# Patient Record
Sex: FEMALE | Race: WHITE | NOT HISPANIC OR LATINO
[De-identification: names, ages, dates, MRNs, and addresses within clinical notes are randomized per-mention and may not be internally consistent; named-entity substitution may affect disease eponyms.]

---

## 2017-01-03 PROBLEM — Z00.00 ENCOUNTER FOR PREVENTIVE HEALTH EXAMINATION: Noted: 2017-01-03

## 2017-01-06 ENCOUNTER — APPOINTMENT (OUTPATIENT)
Dept: OBGYN | Facility: CLINIC | Age: 37
End: 2017-01-06

## 2017-01-17 ENCOUNTER — APPOINTMENT (OUTPATIENT)
Dept: ULTRASOUND IMAGING | Facility: CLINIC | Age: 37
End: 2017-01-17

## 2017-01-17 ENCOUNTER — APPOINTMENT (OUTPATIENT)
Dept: MAMMOGRAPHY | Facility: CLINIC | Age: 37
End: 2017-01-17

## 2017-01-18 ENCOUNTER — APPOINTMENT (OUTPATIENT)
Dept: CARDIOLOGY | Facility: CLINIC | Age: 37
End: 2017-01-18

## 2017-01-18 ENCOUNTER — NON-APPOINTMENT (OUTPATIENT)
Age: 37
End: 2017-01-18

## 2017-01-18 VITALS — OXYGEN SATURATION: 99 % | HEART RATE: 105 BPM | DIASTOLIC BLOOD PRESSURE: 91 MMHG | SYSTOLIC BLOOD PRESSURE: 146 MMHG

## 2017-01-18 DIAGNOSIS — Z82.41 FAMILY HISTORY OF SUDDEN CARDIAC DEATH: ICD-10-CM

## 2017-01-25 ENCOUNTER — APPOINTMENT (OUTPATIENT)
Dept: CV DIAGNOSTICS | Facility: HOSPITAL | Age: 37
End: 2017-01-25

## 2017-01-25 ENCOUNTER — OUTPATIENT (OUTPATIENT)
Dept: OUTPATIENT SERVICES | Facility: HOSPITAL | Age: 37
LOS: 1 days | End: 2017-01-25
Payer: COMMERCIAL

## 2017-01-25 ENCOUNTER — APPOINTMENT (OUTPATIENT)
Dept: CV DIAGNOSITCS | Facility: HOSPITAL | Age: 37
End: 2017-01-25

## 2017-01-25 DIAGNOSIS — R06.09 OTHER FORMS OF DYSPNEA: ICD-10-CM

## 2017-01-25 PROCEDURE — 93017 CV STRESS TEST TRACING ONLY: CPT

## 2017-01-25 PROCEDURE — 93016 CV STRESS TEST SUPVJ ONLY: CPT

## 2017-01-25 PROCEDURE — 93018 CV STRESS TEST I&R ONLY: CPT

## 2017-01-25 PROCEDURE — 93306 TTE W/DOPPLER COMPLETE: CPT

## 2017-01-25 PROCEDURE — 93306 TTE W/DOPPLER COMPLETE: CPT | Mod: 26

## 2017-02-06 ENCOUNTER — NON-APPOINTMENT (OUTPATIENT)
Age: 37
End: 2017-02-06

## 2017-02-06 ENCOUNTER — APPOINTMENT (OUTPATIENT)
Age: 37
End: 2017-02-06

## 2017-02-06 VITALS
BODY MASS INDEX: 25.76 KG/M2 | SYSTOLIC BLOOD PRESSURE: 112 MMHG | HEART RATE: 74 BPM | HEIGHT: 62 IN | WEIGHT: 140 LBS | DIASTOLIC BLOOD PRESSURE: 76 MMHG | OXYGEN SATURATION: 99 %

## 2017-02-13 ENCOUNTER — APPOINTMENT (OUTPATIENT)
Age: 37
End: 2017-02-13

## 2017-03-09 ENCOUNTER — APPOINTMENT (OUTPATIENT)
Dept: HUMAN REPRODUCTION | Facility: CLINIC | Age: 37
End: 2017-03-09

## 2017-05-01 ENCOUNTER — APPOINTMENT (OUTPATIENT)
Dept: OBGYN | Facility: CLINIC | Age: 37
End: 2017-05-01

## 2017-05-17 ENCOUNTER — APPOINTMENT (OUTPATIENT)
Dept: OBGYN | Facility: CLINIC | Age: 37
End: 2017-05-17

## 2017-06-20 ENCOUNTER — APPOINTMENT (OUTPATIENT)
Dept: OBGYN | Facility: CLINIC | Age: 37
End: 2017-06-20

## 2017-07-17 ENCOUNTER — APPOINTMENT (OUTPATIENT)
Dept: ANTEPARTUM | Facility: CLINIC | Age: 37
End: 2017-07-17

## 2017-07-17 ENCOUNTER — ASOB RESULT (OUTPATIENT)
Age: 37
End: 2017-07-17

## 2017-07-18 ENCOUNTER — APPOINTMENT (OUTPATIENT)
Dept: OBGYN | Facility: CLINIC | Age: 37
End: 2017-07-18

## 2017-08-15 ENCOUNTER — APPOINTMENT (OUTPATIENT)
Dept: OBGYN | Facility: CLINIC | Age: 37
End: 2017-08-15
Payer: COMMERCIAL

## 2017-08-15 PROCEDURE — 0502F SUBSEQUENT PRENATAL CARE: CPT

## 2017-08-29 ENCOUNTER — APPOINTMENT (OUTPATIENT)
Dept: OBGYN | Facility: CLINIC | Age: 37
End: 2017-08-29
Payer: COMMERCIAL

## 2017-08-29 PROCEDURE — 0502F SUBSEQUENT PRENATAL CARE: CPT

## 2017-09-01 ENCOUNTER — APPOINTMENT (OUTPATIENT)
Dept: OBGYN | Facility: CLINIC | Age: 37
End: 2017-09-01
Payer: COMMERCIAL

## 2017-09-01 PROCEDURE — 76816 OB US FOLLOW-UP PER FETUS: CPT

## 2017-09-01 PROCEDURE — 76817 TRANSVAGINAL US OBSTETRIC: CPT

## 2017-09-01 PROCEDURE — 0502F SUBSEQUENT PRENATAL CARE: CPT

## 2017-09-05 ENCOUNTER — APPOINTMENT (OUTPATIENT)
Dept: OBGYN | Facility: CLINIC | Age: 37
End: 2017-09-05

## 2017-09-15 ENCOUNTER — APPOINTMENT (OUTPATIENT)
Dept: ANTEPARTUM | Facility: CLINIC | Age: 37
End: 2017-09-15

## 2017-09-18 ENCOUNTER — APPOINTMENT (OUTPATIENT)
Dept: OBGYN | Facility: CLINIC | Age: 37
End: 2017-09-18
Payer: COMMERCIAL

## 2017-09-18 PROCEDURE — 90715 TDAP VACCINE 7 YRS/> IM: CPT

## 2017-09-18 PROCEDURE — 90471 IMMUNIZATION ADMIN: CPT

## 2017-09-18 PROCEDURE — 76815 OB US LIMITED FETUS(S): CPT

## 2017-09-18 PROCEDURE — 76817 TRANSVAGINAL US OBSTETRIC: CPT

## 2017-09-18 PROCEDURE — 36415 COLL VENOUS BLD VENIPUNCTURE: CPT

## 2017-09-18 PROCEDURE — 0502F SUBSEQUENT PRENATAL CARE: CPT

## 2017-10-03 ENCOUNTER — APPOINTMENT (OUTPATIENT)
Dept: OBGYN | Facility: CLINIC | Age: 37
End: 2017-10-03
Payer: COMMERCIAL

## 2017-10-03 PROCEDURE — 0502F SUBSEQUENT PRENATAL CARE: CPT

## 2017-10-16 ENCOUNTER — TRANSCRIPTION ENCOUNTER (OUTPATIENT)
Age: 37
End: 2017-10-16

## 2017-10-17 ENCOUNTER — APPOINTMENT (OUTPATIENT)
Dept: OBGYN | Facility: CLINIC | Age: 37
End: 2017-10-17
Payer: COMMERCIAL

## 2017-10-17 PROCEDURE — 90686 IIV4 VACC NO PRSV 0.5 ML IM: CPT

## 2017-10-17 PROCEDURE — 0502F SUBSEQUENT PRENATAL CARE: CPT

## 2017-10-17 PROCEDURE — 90471 IMMUNIZATION ADMIN: CPT

## 2017-10-17 PROCEDURE — 76816 OB US FOLLOW-UP PER FETUS: CPT

## 2017-10-23 ENCOUNTER — APPOINTMENT (OUTPATIENT)
Dept: OBGYN | Facility: CLINIC | Age: 37
End: 2017-10-23
Payer: COMMERCIAL

## 2017-10-23 PROCEDURE — 0502F SUBSEQUENT PRENATAL CARE: CPT

## 2017-10-31 ENCOUNTER — APPOINTMENT (OUTPATIENT)
Dept: OBGYN | Facility: CLINIC | Age: 37
End: 2017-10-31
Payer: COMMERCIAL

## 2017-10-31 PROCEDURE — 0502F SUBSEQUENT PRENATAL CARE: CPT

## 2017-11-06 ENCOUNTER — OUTPATIENT (OUTPATIENT)
Dept: OUTPATIENT SERVICES | Facility: HOSPITAL | Age: 37
LOS: 1 days | End: 2017-11-06
Payer: COMMERCIAL

## 2017-11-06 DIAGNOSIS — O44.12 COMPLETE PLACENTA PREVIA WITH HEMORRHAGE, SECOND TRIMESTER: ICD-10-CM

## 2017-11-06 DIAGNOSIS — Z01.818 ENCOUNTER FOR OTHER PREPROCEDURAL EXAMINATION: ICD-10-CM

## 2017-11-06 LAB
BLD GP AB SCN SERPL QL: NEGATIVE — SIGNIFICANT CHANGE UP
HCT VFR BLD CALC: 32.9 % — LOW (ref 34.5–45)
HGB BLD-MCNC: 10.6 G/DL — LOW (ref 11.5–15.5)
MCHC RBC-ENTMCNC: 27.8 PG — SIGNIFICANT CHANGE UP (ref 27–34)
MCHC RBC-ENTMCNC: 32.2 GM/DL — SIGNIFICANT CHANGE UP (ref 32–36)
MCV RBC AUTO: 86.4 FL — SIGNIFICANT CHANGE UP (ref 80–100)
PLATELET # BLD AUTO: 279 K/UL — SIGNIFICANT CHANGE UP (ref 150–400)
RBC # BLD: 3.81 M/UL — SIGNIFICANT CHANGE UP (ref 3.8–5.2)
RBC # FLD: 14.2 % — SIGNIFICANT CHANGE UP (ref 10.3–14.5)
RH IG SCN BLD-IMP: POSITIVE — SIGNIFICANT CHANGE UP
WBC # BLD: 12.17 K/UL — HIGH (ref 3.8–10.5)
WBC # FLD AUTO: 12.17 K/UL — HIGH (ref 3.8–10.5)

## 2017-11-06 PROCEDURE — 86901 BLOOD TYPING SEROLOGIC RH(D): CPT

## 2017-11-06 PROCEDURE — 86900 BLOOD TYPING SEROLOGIC ABO: CPT

## 2017-11-06 PROCEDURE — 86850 RBC ANTIBODY SCREEN: CPT

## 2017-11-06 PROCEDURE — G0463: CPT

## 2017-11-06 PROCEDURE — 85027 COMPLETE CBC AUTOMATED: CPT

## 2017-11-06 RX ORDER — CITRIC ACID/SODIUM CITRATE 300-500 MG
15 SOLUTION, ORAL ORAL ONCE
Qty: 0 | Refills: 0 | Status: COMPLETED | OUTPATIENT
Start: 2017-11-07 | End: 2017-11-07

## 2017-11-07 ENCOUNTER — RESULT REVIEW (OUTPATIENT)
Age: 37
End: 2017-11-07

## 2017-11-07 ENCOUNTER — INPATIENT (INPATIENT)
Facility: HOSPITAL | Age: 37
LOS: 3 days | Discharge: HOME | End: 2017-11-11
Attending: OBSTETRICS & GYNECOLOGY | Admitting: OBSTETRICS & GYNECOLOGY
Payer: COMMERCIAL

## 2017-11-07 VITALS
SYSTOLIC BLOOD PRESSURE: 111 MMHG | DIASTOLIC BLOOD PRESSURE: 74 MMHG | HEART RATE: 102 BPM | WEIGHT: 162.92 LBS | RESPIRATION RATE: 18 BRPM | OXYGEN SATURATION: 100 % | HEIGHT: 62 IN | TEMPERATURE: 98 F

## 2017-11-07 DIAGNOSIS — O44.12 COMPLETE PLACENTA PREVIA WITH HEMORRHAGE, SECOND TRIMESTER: ICD-10-CM

## 2017-11-07 LAB
RH IG SCN BLD-IMP: POSITIVE — SIGNIFICANT CHANGE UP
T PALLIDUM AB TITR SER: NEGATIVE — SIGNIFICANT CHANGE UP

## 2017-11-07 PROCEDURE — 58611 LIGATE OVIDUCT(S) ADD-ON: CPT

## 2017-11-07 PROCEDURE — 59510 CESAREAN DELIVERY: CPT

## 2017-11-07 RX ORDER — KETOROLAC TROMETHAMINE 30 MG/ML
30 SYRINGE (ML) INJECTION EVERY 6 HOURS
Qty: 0 | Refills: 0 | Status: DISCONTINUED | OUTPATIENT
Start: 2017-11-07 | End: 2017-11-09

## 2017-11-07 RX ORDER — IBUPROFEN 200 MG
600 TABLET ORAL EVERY 6 HOURS
Qty: 0 | Refills: 0 | Status: COMPLETED | OUTPATIENT
Start: 2017-11-07 | End: 2018-10-06

## 2017-11-07 RX ORDER — HYDROMORPHONE HYDROCHLORIDE 2 MG/ML
0.25 INJECTION INTRAMUSCULAR; INTRAVENOUS; SUBCUTANEOUS
Qty: 0 | Refills: 0 | Status: DISCONTINUED | OUTPATIENT
Start: 2017-11-07 | End: 2017-11-07

## 2017-11-07 RX ORDER — SODIUM CHLORIDE 9 MG/ML
1000 INJECTION, SOLUTION INTRAVENOUS ONCE
Qty: 0 | Refills: 0 | Status: COMPLETED | OUTPATIENT
Start: 2017-11-07 | End: 2017-11-07

## 2017-11-07 RX ORDER — OXYTOCIN 10 UNIT/ML
41.67 VIAL (ML) INJECTION
Qty: 20 | Refills: 0 | Status: DISCONTINUED | OUTPATIENT
Start: 2017-11-07 | End: 2017-11-11

## 2017-11-07 RX ORDER — LEVOTHYROXINE SODIUM 125 MCG
1 TABLET ORAL
Qty: 0 | Refills: 0 | COMMUNITY

## 2017-11-07 RX ORDER — FERROUS SULFATE 325(65) MG
1 TABLET ORAL
Qty: 0 | Refills: 0 | COMMUNITY

## 2017-11-07 RX ORDER — ACETAMINOPHEN 500 MG
975 TABLET ORAL EVERY 6 HOURS
Qty: 0 | Refills: 0 | Status: DISCONTINUED | OUTPATIENT
Start: 2017-11-07 | End: 2017-11-11

## 2017-11-07 RX ORDER — METOCLOPRAMIDE HCL 10 MG
10 TABLET ORAL ONCE
Qty: 0 | Refills: 0 | Status: DISCONTINUED | OUTPATIENT
Start: 2017-11-07 | End: 2017-11-07

## 2017-11-07 RX ORDER — TETANUS TOXOID, REDUCED DIPHTHERIA TOXOID AND ACELLULAR PERTUSSIS VACCINE, ADSORBED 5; 2.5; 8; 8; 2.5 [IU]/.5ML; [IU]/.5ML; UG/.5ML; UG/.5ML; UG/.5ML
0.5 SUSPENSION INTRAMUSCULAR ONCE
Qty: 0 | Refills: 0 | Status: DISCONTINUED | OUTPATIENT
Start: 2017-11-07 | End: 2017-11-11

## 2017-11-07 RX ORDER — ONDANSETRON 8 MG/1
4 TABLET, FILM COATED ORAL ONCE
Qty: 0 | Refills: 0 | Status: DISCONTINUED | OUTPATIENT
Start: 2017-11-07 | End: 2017-11-07

## 2017-11-07 RX ORDER — OXYTOCIN 10 UNIT/ML
333.33 VIAL (ML) INJECTION
Qty: 20 | Refills: 0 | Status: DISCONTINUED | OUTPATIENT
Start: 2017-11-07 | End: 2017-11-11

## 2017-11-07 RX ORDER — DIPHENHYDRAMINE HCL 50 MG
25 CAPSULE ORAL EVERY 6 HOURS
Qty: 0 | Refills: 0 | Status: DISCONTINUED | OUTPATIENT
Start: 2017-11-07 | End: 2017-11-11

## 2017-11-07 RX ORDER — OXYCODONE HYDROCHLORIDE 5 MG/1
5 TABLET ORAL EVERY 4 HOURS
Qty: 0 | Refills: 0 | Status: DISCONTINUED | OUTPATIENT
Start: 2017-11-07 | End: 2017-11-11

## 2017-11-07 RX ORDER — FERROUS SULFATE 325(65) MG
325 TABLET ORAL DAILY
Qty: 0 | Refills: 0 | Status: DISCONTINUED | OUTPATIENT
Start: 2017-11-07 | End: 2017-11-08

## 2017-11-07 RX ORDER — NALOXONE HYDROCHLORIDE 4 MG/.1ML
0.1 SPRAY NASAL
Qty: 0 | Refills: 0 | Status: DISCONTINUED | OUTPATIENT
Start: 2017-11-07 | End: 2017-11-09

## 2017-11-07 RX ORDER — ONDANSETRON 8 MG/1
4 TABLET, FILM COATED ORAL EVERY 6 HOURS
Qty: 0 | Refills: 0 | Status: DISCONTINUED | OUTPATIENT
Start: 2017-11-07 | End: 2017-11-09

## 2017-11-07 RX ORDER — SODIUM CHLORIDE 9 MG/ML
1000 INJECTION, SOLUTION INTRAVENOUS
Qty: 0 | Refills: 0 | Status: DISCONTINUED | OUTPATIENT
Start: 2017-11-07 | End: 2017-11-07

## 2017-11-07 RX ORDER — LANOLIN
1 OINTMENT (GRAM) TOPICAL
Qty: 0 | Refills: 0 | Status: DISCONTINUED | OUTPATIENT
Start: 2017-11-07 | End: 2017-11-11

## 2017-11-07 RX ORDER — DEXAMETHASONE 0.5 MG/5ML
4 ELIXIR ORAL EVERY 6 HOURS
Qty: 0 | Refills: 0 | Status: DISCONTINUED | OUTPATIENT
Start: 2017-11-07 | End: 2017-11-09

## 2017-11-07 RX ORDER — SODIUM CHLORIDE 9 MG/ML
1000 INJECTION, SOLUTION INTRAVENOUS
Qty: 0 | Refills: 0 | Status: DISCONTINUED | OUTPATIENT
Start: 2017-11-07 | End: 2017-11-11

## 2017-11-07 RX ORDER — FAMOTIDINE 10 MG/ML
20 INJECTION INTRAVENOUS ONCE
Qty: 0 | Refills: 0 | Status: COMPLETED | OUTPATIENT
Start: 2017-11-07 | End: 2017-11-07

## 2017-11-07 RX ORDER — OXYCODONE HYDROCHLORIDE 5 MG/1
5 TABLET ORAL
Qty: 0 | Refills: 0 | Status: COMPLETED | OUTPATIENT
Start: 2017-11-07 | End: 2017-11-14

## 2017-11-07 RX ORDER — HEPARIN SODIUM 5000 [USP'U]/ML
5000 INJECTION INTRAVENOUS; SUBCUTANEOUS EVERY 12 HOURS
Qty: 0 | Refills: 0 | Status: DISCONTINUED | OUTPATIENT
Start: 2017-11-07 | End: 2017-11-11

## 2017-11-07 RX ORDER — LEVOTHYROXINE SODIUM 125 MCG
50 TABLET ORAL DAILY
Qty: 0 | Refills: 0 | Status: DISCONTINUED | OUTPATIENT
Start: 2017-11-07 | End: 2017-11-07

## 2017-11-07 RX ORDER — CEFAZOLIN SODIUM 1 G
2000 VIAL (EA) INJECTION ONCE
Qty: 0 | Refills: 0 | Status: COMPLETED | OUTPATIENT
Start: 2017-11-07 | End: 2017-11-07

## 2017-11-07 RX ORDER — DOCUSATE SODIUM 100 MG
100 CAPSULE ORAL
Qty: 0 | Refills: 0 | Status: DISCONTINUED | OUTPATIENT
Start: 2017-11-07 | End: 2017-11-08

## 2017-11-07 RX ORDER — SIMETHICONE 80 MG/1
80 TABLET, CHEWABLE ORAL EVERY 4 HOURS
Qty: 0 | Refills: 0 | Status: DISCONTINUED | OUTPATIENT
Start: 2017-11-07 | End: 2017-11-11

## 2017-11-07 RX ORDER — GLYCERIN ADULT
1 SUPPOSITORY, RECTAL RECTAL AT BEDTIME
Qty: 0 | Refills: 0 | Status: DISCONTINUED | OUTPATIENT
Start: 2017-11-07 | End: 2017-11-11

## 2017-11-07 RX ADMIN — Medication 15 MILLILITER(S): at 13:29

## 2017-11-07 RX ADMIN — Medication 30 MILLIGRAM(S): at 20:48

## 2017-11-07 RX ADMIN — FAMOTIDINE 20 MILLIGRAM(S): 10 INJECTION INTRAVENOUS at 13:43

## 2017-11-07 RX ADMIN — Medication 125 MILLIUNIT(S)/MIN: at 20:17

## 2017-11-07 RX ADMIN — Medication 30 MILLIGRAM(S): at 21:37

## 2017-11-07 RX ADMIN — SODIUM CHLORIDE 125 MILLILITER(S): 9 INJECTION, SOLUTION INTRAVENOUS at 14:27

## 2017-11-07 RX ADMIN — SODIUM CHLORIDE 2000 MILLILITER(S): 9 INJECTION, SOLUTION INTRAVENOUS at 13:43

## 2017-11-08 ENCOUNTER — TRANSCRIPTION ENCOUNTER (OUTPATIENT)
Age: 37
End: 2017-11-08

## 2017-11-08 DIAGNOSIS — D50.0 IRON DEFICIENCY ANEMIA SECONDARY TO BLOOD LOSS (CHRONIC): ICD-10-CM

## 2017-11-08 DIAGNOSIS — E03.9 HYPOTHYROIDISM, UNSPECIFIED: ICD-10-CM

## 2017-11-08 LAB
HCT VFR BLD CALC: 27.8 % — LOW (ref 34.5–45)
HGB BLD-MCNC: 9.1 G/DL — LOW (ref 11.5–15.5)
MCHC RBC-ENTMCNC: 28.6 PG — SIGNIFICANT CHANGE UP (ref 27–34)
MCHC RBC-ENTMCNC: 32.7 GM/DL — SIGNIFICANT CHANGE UP (ref 32–36)
MCV RBC AUTO: 87.6 FL — SIGNIFICANT CHANGE UP (ref 80–100)
PLATELET # BLD AUTO: 240 K/UL — SIGNIFICANT CHANGE UP (ref 150–400)
RBC # BLD: 3.18 M/UL — LOW (ref 3.8–5.2)
RBC # FLD: 13.4 % — SIGNIFICANT CHANGE UP (ref 10.3–14.5)
WBC # BLD: 16.1 K/UL — HIGH (ref 3.8–10.5)
WBC # FLD AUTO: 16.1 K/UL — HIGH (ref 3.8–10.5)

## 2017-11-08 RX ORDER — DOCUSATE SODIUM 100 MG
100 CAPSULE ORAL THREE TIMES A DAY
Qty: 0 | Refills: 0 | Status: DISCONTINUED | OUTPATIENT
Start: 2017-11-08 | End: 2017-11-11

## 2017-11-08 RX ORDER — ASCORBIC ACID 60 MG
250 TABLET,CHEWABLE ORAL THREE TIMES A DAY
Qty: 0 | Refills: 0 | Status: DISCONTINUED | OUTPATIENT
Start: 2017-11-08 | End: 2017-11-11

## 2017-11-08 RX ORDER — FERROUS SULFATE 325(65) MG
325 TABLET ORAL
Qty: 0 | Refills: 0 | Status: DISCONTINUED | OUTPATIENT
Start: 2017-11-08 | End: 2017-11-11

## 2017-11-08 RX ORDER — LEVOTHYROXINE SODIUM 125 MCG
50 TABLET ORAL DAILY
Qty: 0 | Refills: 0 | Status: DISCONTINUED | OUTPATIENT
Start: 2017-11-08 | End: 2017-11-11

## 2017-11-08 RX ADMIN — Medication 250 MILLIGRAM(S): at 21:01

## 2017-11-08 RX ADMIN — Medication 25 MILLIGRAM(S): at 21:05

## 2017-11-08 RX ADMIN — Medication 30 MILLIGRAM(S): at 20:57

## 2017-11-08 RX ADMIN — Medication 975 MILLIGRAM(S): at 13:07

## 2017-11-08 RX ADMIN — Medication 100 MILLIGRAM(S): at 13:31

## 2017-11-08 RX ADMIN — Medication 100 MILLIGRAM(S): at 21:01

## 2017-11-08 RX ADMIN — Medication 250 MILLIGRAM(S): at 13:32

## 2017-11-08 RX ADMIN — HEPARIN SODIUM 5000 UNIT(S): 5000 INJECTION INTRAVENOUS; SUBCUTANEOUS at 05:17

## 2017-11-08 RX ADMIN — Medication 30 MILLIGRAM(S): at 08:45

## 2017-11-08 RX ADMIN — Medication 325 MILLIGRAM(S): at 08:44

## 2017-11-08 RX ADMIN — HEPARIN SODIUM 5000 UNIT(S): 5000 INJECTION INTRAVENOUS; SUBCUTANEOUS at 18:01

## 2017-11-08 RX ADMIN — Medication 30 MILLIGRAM(S): at 21:30

## 2017-11-08 RX ADMIN — Medication 325 MILLIGRAM(S): at 18:00

## 2017-11-08 RX ADMIN — Medication 30 MILLIGRAM(S): at 08:53

## 2017-11-08 RX ADMIN — Medication 30 MILLIGRAM(S): at 03:01

## 2017-11-08 RX ADMIN — Medication 50 MICROGRAM(S): at 06:22

## 2017-11-08 RX ADMIN — Medication 325 MILLIGRAM(S): at 13:31

## 2017-11-08 RX ADMIN — Medication 1 TABLET(S): at 13:07

## 2017-11-08 RX ADMIN — Medication 30 MILLIGRAM(S): at 16:14

## 2017-11-08 RX ADMIN — Medication 30 MILLIGRAM(S): at 15:38

## 2017-11-08 RX ADMIN — Medication 100 MILLIGRAM(S): at 08:45

## 2017-11-08 RX ADMIN — Medication 25 MILLIGRAM(S): at 15:43

## 2017-11-08 RX ADMIN — Medication 30 MILLIGRAM(S): at 03:30

## 2017-11-08 RX ADMIN — Medication 975 MILLIGRAM(S): at 13:45

## 2017-11-08 NOTE — LACTATION INITIAL EVALUATION - LACTATION INTERVENTIONS
mother declined pump observation, verbal instructions given along with written. to pump to initiate her supply until the infant is medically cleared to breastfeed./initiate skin to skin/initiate hand expression routine/initiate dual electric pump routine

## 2017-11-08 NOTE — LACTATION INITIAL EVALUATION - NS LACT CON REASON FOR REQ
general questions without assessment/37 weeks in NICU for RDS/premature/compromised infant/pump request/multiparous mom

## 2017-11-09 RX ORDER — IBUPROFEN 200 MG
600 TABLET ORAL EVERY 6 HOURS
Qty: 0 | Refills: 0 | Status: DISCONTINUED | OUTPATIENT
Start: 2017-11-09 | End: 2017-11-11

## 2017-11-09 RX ORDER — CALAMINE AND ZINC OXIDE AND PHENOL 160; 10 MG/ML; MG/ML
1 LOTION TOPICAL
Qty: 0 | Refills: 0 | Status: DISCONTINUED | OUTPATIENT
Start: 2017-11-09 | End: 2017-11-11

## 2017-11-09 RX ORDER — OXYCODONE HYDROCHLORIDE 5 MG/1
5 TABLET ORAL
Qty: 0 | Refills: 0 | Status: DISCONTINUED | OUTPATIENT
Start: 2017-11-09 | End: 2017-11-11

## 2017-11-09 RX ORDER — MAGNESIUM HYDROXIDE 400 MG/1
30 TABLET, CHEWABLE ORAL DAILY
Qty: 0 | Refills: 0 | Status: DISCONTINUED | OUTPATIENT
Start: 2017-11-09 | End: 2017-11-11

## 2017-11-09 RX ADMIN — OXYCODONE HYDROCHLORIDE 5 MILLIGRAM(S): 5 TABLET ORAL at 07:43

## 2017-11-09 RX ADMIN — OXYCODONE HYDROCHLORIDE 5 MILLIGRAM(S): 5 TABLET ORAL at 04:10

## 2017-11-09 RX ADMIN — Medication 1 TABLET(S): at 11:59

## 2017-11-09 RX ADMIN — Medication 325 MILLIGRAM(S): at 16:57

## 2017-11-09 RX ADMIN — Medication 600 MILLIGRAM(S): at 12:45

## 2017-11-09 RX ADMIN — OXYCODONE HYDROCHLORIDE 5 MILLIGRAM(S): 5 TABLET ORAL at 22:28

## 2017-11-09 RX ADMIN — Medication 250 MILLIGRAM(S): at 06:31

## 2017-11-09 RX ADMIN — OXYCODONE HYDROCHLORIDE 5 MILLIGRAM(S): 5 TABLET ORAL at 22:58

## 2017-11-09 RX ADMIN — Medication 30 MILLIGRAM(S): at 03:35

## 2017-11-09 RX ADMIN — Medication 975 MILLIGRAM(S): at 22:29

## 2017-11-09 RX ADMIN — Medication 975 MILLIGRAM(S): at 16:57

## 2017-11-09 RX ADMIN — Medication 30 MILLIGRAM(S): at 04:10

## 2017-11-09 RX ADMIN — Medication 975 MILLIGRAM(S): at 17:30

## 2017-11-09 RX ADMIN — Medication 100 MILLIGRAM(S): at 21:17

## 2017-11-09 RX ADMIN — Medication 250 MILLIGRAM(S): at 16:58

## 2017-11-09 RX ADMIN — Medication 325 MILLIGRAM(S): at 11:59

## 2017-11-09 RX ADMIN — OXYCODONE HYDROCHLORIDE 5 MILLIGRAM(S): 5 TABLET ORAL at 17:30

## 2017-11-09 RX ADMIN — OXYCODONE HYDROCHLORIDE 5 MILLIGRAM(S): 5 TABLET ORAL at 12:45

## 2017-11-09 RX ADMIN — Medication 975 MILLIGRAM(S): at 22:59

## 2017-11-09 RX ADMIN — Medication 50 MICROGRAM(S): at 06:31

## 2017-11-09 RX ADMIN — OXYCODONE HYDROCHLORIDE 5 MILLIGRAM(S): 5 TABLET ORAL at 03:41

## 2017-11-09 RX ADMIN — Medication 100 MILLIGRAM(S): at 06:31

## 2017-11-09 RX ADMIN — OXYCODONE HYDROCHLORIDE 5 MILLIGRAM(S): 5 TABLET ORAL at 16:55

## 2017-11-09 RX ADMIN — OXYCODONE HYDROCHLORIDE 5 MILLIGRAM(S): 5 TABLET ORAL at 08:30

## 2017-11-09 RX ADMIN — SIMETHICONE 80 MILLIGRAM(S): 80 TABLET, CHEWABLE ORAL at 07:42

## 2017-11-09 RX ADMIN — Medication 600 MILLIGRAM(S): at 21:47

## 2017-11-09 RX ADMIN — SIMETHICONE 80 MILLIGRAM(S): 80 TABLET, CHEWABLE ORAL at 18:32

## 2017-11-09 RX ADMIN — HEPARIN SODIUM 5000 UNIT(S): 5000 INJECTION INTRAVENOUS; SUBCUTANEOUS at 06:31

## 2017-11-09 RX ADMIN — OXYCODONE HYDROCHLORIDE 5 MILLIGRAM(S): 5 TABLET ORAL at 11:59

## 2017-11-09 RX ADMIN — Medication 250 MILLIGRAM(S): at 21:17

## 2017-11-09 RX ADMIN — Medication 600 MILLIGRAM(S): at 12:07

## 2017-11-09 RX ADMIN — Medication 600 MILLIGRAM(S): at 21:17

## 2017-11-09 RX ADMIN — Medication 100 MILLIGRAM(S): at 16:57

## 2017-11-09 RX ADMIN — MAGNESIUM HYDROXIDE 30 MILLILITER(S): 400 TABLET, CHEWABLE ORAL at 22:10

## 2017-11-10 ENCOUNTER — TRANSCRIPTION ENCOUNTER (OUTPATIENT)
Age: 37
End: 2017-11-10

## 2017-11-10 LAB
HCT VFR BLD CALC: 26.1 % — LOW (ref 34.5–45)
HGB BLD-MCNC: 8.8 G/DL — LOW (ref 11.5–15.5)
MCHC RBC-ENTMCNC: 29.8 PG — SIGNIFICANT CHANGE UP (ref 27–34)
MCHC RBC-ENTMCNC: 33.7 GM/DL — SIGNIFICANT CHANGE UP (ref 32–36)
MCV RBC AUTO: 88.5 FL — SIGNIFICANT CHANGE UP (ref 80–100)
PLATELET # BLD AUTO: 265 K/UL — SIGNIFICANT CHANGE UP (ref 150–400)
RBC # BLD: 2.95 M/UL — LOW (ref 3.8–5.2)
RBC # FLD: 13.6 % — SIGNIFICANT CHANGE UP (ref 10.3–14.5)
WBC # BLD: 13.2 K/UL — HIGH (ref 3.8–10.5)
WBC # FLD AUTO: 13.2 K/UL — HIGH (ref 3.8–10.5)

## 2017-11-10 RX ORDER — DOCUSATE SODIUM 100 MG
1 CAPSULE ORAL
Qty: 0 | Refills: 0 | COMMUNITY

## 2017-11-10 RX ORDER — SENNA PLUS 8.6 MG/1
1 TABLET ORAL DAILY
Qty: 0 | Refills: 0 | Status: DISCONTINUED | OUTPATIENT
Start: 2017-11-10 | End: 2017-11-11

## 2017-11-10 RX ORDER — ASCORBIC ACID 60 MG
1 TABLET,CHEWABLE ORAL
Qty: 0 | Refills: 0 | COMMUNITY
Start: 2017-11-10

## 2017-11-10 RX ORDER — ACETAMINOPHEN 500 MG
3 TABLET ORAL
Qty: 0 | Refills: 0 | COMMUNITY
Start: 2017-11-10

## 2017-11-10 RX ORDER — DOCUSATE SODIUM 100 MG
1 CAPSULE ORAL
Qty: 0 | Refills: 0 | COMMUNITY
Start: 2017-11-10

## 2017-11-10 RX ORDER — IBUPROFEN 200 MG
1 TABLET ORAL
Qty: 0 | Refills: 0 | COMMUNITY
Start: 2017-11-10

## 2017-11-10 RX ORDER — OXYCODONE HYDROCHLORIDE 5 MG/1
1 TABLET ORAL
Qty: 0 | Refills: 0 | COMMUNITY
Start: 2017-11-10

## 2017-11-10 RX ADMIN — Medication 600 MILLIGRAM(S): at 12:45

## 2017-11-10 RX ADMIN — Medication 600 MILLIGRAM(S): at 11:51

## 2017-11-10 RX ADMIN — OXYCODONE HYDROCHLORIDE 5 MILLIGRAM(S): 5 TABLET ORAL at 17:00

## 2017-11-10 RX ADMIN — Medication 325 MILLIGRAM(S): at 08:00

## 2017-11-10 RX ADMIN — Medication 250 MILLIGRAM(S): at 06:13

## 2017-11-10 RX ADMIN — Medication 100 MILLIGRAM(S): at 06:13

## 2017-11-10 RX ADMIN — Medication 600 MILLIGRAM(S): at 04:44

## 2017-11-10 RX ADMIN — OXYCODONE HYDROCHLORIDE 5 MILLIGRAM(S): 5 TABLET ORAL at 16:07

## 2017-11-10 RX ADMIN — HEPARIN SODIUM 5000 UNIT(S): 5000 INJECTION INTRAVENOUS; SUBCUTANEOUS at 17:37

## 2017-11-10 RX ADMIN — Medication 600 MILLIGRAM(S): at 23:46

## 2017-11-10 RX ADMIN — Medication 975 MILLIGRAM(S): at 22:46

## 2017-11-10 RX ADMIN — Medication 975 MILLIGRAM(S): at 04:44

## 2017-11-10 RX ADMIN — Medication 600 MILLIGRAM(S): at 22:46

## 2017-11-10 RX ADMIN — SENNA PLUS 1 TABLET(S): 8.6 TABLET ORAL at 17:29

## 2017-11-10 RX ADMIN — HEPARIN SODIUM 5000 UNIT(S): 5000 INJECTION INTRAVENOUS; SUBCUTANEOUS at 06:12

## 2017-11-10 RX ADMIN — Medication 325 MILLIGRAM(S): at 11:51

## 2017-11-10 RX ADMIN — Medication 975 MILLIGRAM(S): at 12:45

## 2017-11-10 RX ADMIN — Medication 50 MICROGRAM(S): at 06:12

## 2017-11-10 RX ADMIN — Medication 1 TABLET(S): at 11:51

## 2017-11-10 RX ADMIN — Medication 975 MILLIGRAM(S): at 11:51

## 2017-11-10 RX ADMIN — Medication 975 MILLIGRAM(S): at 23:46

## 2017-11-10 NOTE — DISCHARGE NOTE OB - CARE PLAN
Principal Discharge DX:	 delivery delivered  Goal:	return to baseline  Instructions for follow-up, activity and diet:	incision check in 2 weeks, pelvic rest x 6 weeks

## 2017-11-10 NOTE — CHART NOTE - NSCHARTNOTEFT_GEN_A_CORE
NP Event Note:    called to the bedside by the RN for a pt c/o increased bloating with mild nausea today. pt denies epigastric pain. reports + flatus. denies vomiting. able to eat a small lunch of pretzels    VS  BP: 107/71, P: 86. T: 36.7  gen:A&ox3  CV: rrr s1s2  abd: mild distended, non tender, normoactive BS x 4 quadrants  LE: no edema noted    36 y/o POD# 3 primary c/s for a placenta previa with abd. bloating  - ambulation to promote bowel movement  -mylicon for gas relief  -sennakot for gas relief  -bland food to prevent the formation of gas    Ariadne Dumont NP

## 2017-11-10 NOTE — DISCHARGE NOTE OB - MATERIALS PROVIDED
Discharge Medication Information for Patients and Families Pocket Guide/Vaccinations/[facility]  Screening Program/Shaken Baby Prevention Handout/Breastfeeding Mother’s Support Group Information/Guide to Postpartum Care/Birth Certificate Instructions/Back To Sleep Handout/[facility] Hearing Screen Program/Breastfeeding Guide and Packet/[place]  Immunization Record/Breastfeeding Log

## 2017-11-10 NOTE — DISCHARGE NOTE OB - MEDICATION SUMMARY - MEDICATIONS TO TAKE
I will START or STAY ON the medications listed below when I get home from the hospital:    ibuprofen 600 mg oral tablet  -- 1 tab(s) by mouth every 6 hours  -- Indication: For Pain    acetaminophen 325 mg oral tablet  -- 3 tab(s) by mouth every 6 hours  -- Indication: For Pain    oxyCODONE 5 mg oral tablet  -- 1 tab(s) by mouth every 4 hours, As needed, Severe Pain (7 - 10)  -- Indication: For Pain    Slow Fe 160 mg (50 mg elemental iron) oral tablet, extended release  -- 1 tab(s) by mouth once a day  -- Indication: For Anemia    Prenatal Multivitamins oral tablet  -- 1 tab(s) by mouth once a day  -- Indication: For Pnv    docusate sodium 100 mg oral capsule  -- 1 cap(s) by mouth 3 times a day  -- Indication: For Constipation    levothyroxine 50 mcg (0.05 mg) oral tablet  -- 1 tab(s) by mouth 6 times a week,m except sunday  -- Indication: For Hypothyroidism, unspecified type    ascorbic acid 250 mg oral tablet  -- 1 tab(s) by mouth 3 times a day  -- Indication: For Anemia I will START or STAY ON the medications listed below when I get home from the hospital:    ibuprofen 600 mg oral tablet  -- 1 tab(s) by mouth every 6 hours  -- Indication: For Pain    acetaminophen 325 mg oral tablet  -- 3 tab(s) by mouth every 6 hours  -- Indication: For Pain    oxyCODONE 5 mg oral tablet  -- 1 tab(s) by mouth every 4 hours, As needed, Severe Pain (7 - 10)  -- Indication: For Pain    oxyCODONE 5 mg oral tablet  -- 1 tab(s) by mouth every 4 hours MDD:5  -- Caution federal law prohibits the transfer of this drug to any person other  than the person for whom it was prescribed.  It is very important that you take or use this exactly as directed.  Do not skip doses or discontinue unless directed by your doctor.  May cause drowsiness.  Alcohol may intensify this effect.  Use care when operating dangerous machinery.  This prescription cannot be refilled.  Using more of this medication than prescribed may cause serious breathing problems.    -- Indication: For moderate pain    Slow Fe 160 mg (50 mg elemental iron) oral tablet, extended release  -- 1 tab(s) by mouth once a day  -- Indication: For Anemia    Prenatal Multivitamins oral tablet  -- 1 tab(s) by mouth once a day  -- Indication: For Pnv    docusate sodium 100 mg oral capsule  -- 1 cap(s) by mouth 3 times a day  -- Indication: For Constipation    levothyroxine 50 mcg (0.05 mg) oral tablet  -- 1 tab(s) by mouth 6 times a week,m except sunday  -- Indication: For Hypothyroidism, unspecified type    ascorbic acid 250 mg oral tablet  -- 1 tab(s) by mouth 3 times a day  -- Indication: For Anemia

## 2017-11-10 NOTE — DISCHARGE NOTE OB - PATIENT PORTAL LINK FT
“You can access the [facility] Patient Portal, offered by [facility], by registering with the following website: [email or web address]”

## 2017-11-11 VITALS
RESPIRATION RATE: 18 BRPM | TEMPERATURE: 99 F | HEART RATE: 81 BPM | SYSTOLIC BLOOD PRESSURE: 113 MMHG | DIASTOLIC BLOOD PRESSURE: 75 MMHG

## 2017-11-11 PROCEDURE — P9037: CPT

## 2017-11-11 PROCEDURE — 86900 BLOOD TYPING SEROLOGIC ABO: CPT

## 2017-11-11 PROCEDURE — 86901 BLOOD TYPING SEROLOGIC RH(D): CPT

## 2017-11-11 PROCEDURE — 86923 COMPATIBILITY TEST ELECTRIC: CPT

## 2017-11-11 PROCEDURE — 59025 FETAL NON-STRESS TEST: CPT

## 2017-11-11 PROCEDURE — 85027 COMPLETE CBC AUTOMATED: CPT

## 2017-11-11 PROCEDURE — P9016: CPT

## 2017-11-11 PROCEDURE — 86780 TREPONEMA PALLIDUM: CPT

## 2017-11-11 PROCEDURE — 59050 FETAL MONITOR W/REPORT: CPT

## 2017-11-11 PROCEDURE — P9059: CPT

## 2017-11-11 RX ORDER — OXYCODONE HYDROCHLORIDE 5 MG/1
1 TABLET ORAL
Qty: 20 | Refills: 0 | OUTPATIENT
Start: 2017-11-11

## 2017-11-11 RX ADMIN — Medication 1 TABLET(S): at 12:24

## 2017-11-11 RX ADMIN — Medication 600 MILLIGRAM(S): at 06:17

## 2017-11-11 RX ADMIN — Medication 600 MILLIGRAM(S): at 12:25

## 2017-11-11 RX ADMIN — HEPARIN SODIUM 5000 UNIT(S): 5000 INJECTION INTRAVENOUS; SUBCUTANEOUS at 06:17

## 2017-11-11 RX ADMIN — Medication 250 MILLIGRAM(S): at 06:18

## 2017-11-11 RX ADMIN — Medication 600 MILLIGRAM(S): at 19:23

## 2017-11-11 RX ADMIN — Medication 975 MILLIGRAM(S): at 06:18

## 2017-11-11 RX ADMIN — OXYCODONE HYDROCHLORIDE 5 MILLIGRAM(S): 5 TABLET ORAL at 18:53

## 2017-11-11 RX ADMIN — Medication 100 MILLIGRAM(S): at 12:26

## 2017-11-11 RX ADMIN — Medication 975 MILLIGRAM(S): at 12:24

## 2017-11-11 RX ADMIN — HEPARIN SODIUM 5000 UNIT(S): 5000 INJECTION INTRAVENOUS; SUBCUTANEOUS at 18:59

## 2017-11-11 RX ADMIN — Medication 50 MICROGRAM(S): at 07:42

## 2017-11-11 RX ADMIN — Medication 250 MILLIGRAM(S): at 18:53

## 2017-11-11 RX ADMIN — Medication 600 MILLIGRAM(S): at 18:53

## 2017-11-11 RX ADMIN — Medication 600 MILLIGRAM(S): at 13:00

## 2017-11-11 RX ADMIN — Medication 600 MILLIGRAM(S): at 07:15

## 2017-11-11 RX ADMIN — Medication 975 MILLIGRAM(S): at 13:00

## 2017-11-11 NOTE — PROGRESS NOTE ADULT - SUBJECTIVE AND OBJECTIVE BOX
Day 2 of Anesthesia Pain Management Service    SUBJECTIVE: I'm okay  Pain Scale Score:    [X] Refer to charted pain scores    THERAPY: Epidural Bupivacaine 0.01 % and Fentanyl 3 micrograms/mL     Demand Dose: 3 mL  Lockout: 15 minutes   Continuous Rate:  10 mL    MEDICATIONS  (STANDING):  acetaminophen   Tablet. 975 milliGRAM(s) Oral every 6 hours  ascorbic acid 250 milliGRAM(s) Oral three times a day  diphtheria/tetanus/pertussis (acellular) Vaccine (ADAcel) 0.5 milliLiter(s) IntraMuscular once  docusate sodium 100 milliGRAM(s) Oral three times a day  ferrous    sulfate 325 milliGRAM(s) Oral three times a day with meals  heparin  Injectable 5000 Unit(s) SubCutaneous every 12 hours  ibuprofen  Tablet 600 milliGRAM(s) Oral every 6 hours  ketorolac   Injectable 30 milliGRAM(s) IV Push every 6 hours  lactated ringers. 1000 milliLiter(s) (125 mL/Hr) IV Continuous <Continuous>  levothyroxine 50 MICROGram(s) Oral daily  oxyCODONE    IR 5 milliGRAM(s) Oral every 3 hours  oxytocin Infusion 41.667 milliUNIT(s)/Min (125 mL/Hr) IV Continuous <Continuous>  oxytocin Infusion 333.333 milliUNIT(s)/Min (1000 mL/Hr) IV Continuous <Continuous>  oxytocin Infusion 41.667 milliUNIT(s)/Min (125 mL/Hr) IV Continuous <Continuous>  prenatal multivitamin 1 Tablet(s) Oral daily    MEDICATIONS  (PRN):  diphenhydrAMINE   Capsule 25 milliGRAM(s) Oral every 6 hours PRN Itching  glycerin Suppository - Adult 1 Suppository(s) Rectal at bedtime PRN Constipation  lanolin Ointment 1 Application(s) Topical every 3 hours PRN Sore Nipples  oxyCODONE    IR 5 milliGRAM(s) Oral every 4 hours PRN Severe Pain (7 - 10)  simethicone 80 milliGRAM(s) Chew every 4 hours PRN Gas      OBJECTIVE:    Assessment of Epidural Catheter Site: 	    [ ] Dressing intact	[X] Site non-tender	[X] Site without erythema, discharge, edema  [ ] Epidural tubing and connection checked	[X] Gross neurological exam within normal limits  [X] Catheter removed                          9.1    16.1  )-----------( 240      ( 08 Nov 2017 05:35 )             27.8     Vital Signs Last 24 Hrs  T(C): 36.8 (11-09-17 @ 05:00), Max: 36.8 (11-08-17 @ 08:46)  T(F): 98.3 (11-09-17 @ 05:00), Max: 98.3 (11-09-17 @ 05:00)  HR: 71 (11-09-17 @ 05:00) (71 - 92)  BP: 104/71 (11-09-17 @ 05:00) (101/66 - 114/74)  BP(mean): --  RR: 18 (11-09-17 @ 05:00) (18 - 18)  SpO2: 97% (11-08-17 @ 21:17) (96% - 97%)      Sedation Score:	[X] Alert	[ ] Drowsy	[ ] Arousable  [ ] Asleep  [ ] Unresponsive    Side Effects:	[X] None	[ ] Nausea	[ ] Vomiting  [ ] Pruritus  		[ ] Weakness  [ ] Numbness  [ ] Other:    ASSESSMENT/ PLAN:    Therapy:                         [ ] Continue   [X] Discontinue   [X] Change to PRN Analgesics    Documentation and Verification of current medications:  [X] Done	[ ] Not done, not eligible, reason:    Comments:
Day 1 of Anesthesia Pain Management Service    SUBJECTIVE: I'm okay  Pain Scale Score:    [X] Refer to charted pain scores    THERAPY: Epidural Bupivacaine 0.01 % and Fentanyl 3 micrograms/mL     Demand Dose: 3 mL  Lockout: 15 minutes   Continuous Rate:  12 mL    MEDICATIONS  (STANDING):  acetaminophen   Tablet. 975 milliGRAM(s) Oral every 6 hours  ascorbic acid 250 milliGRAM(s) Oral three times a day  diphtheria/tetanus/pertussis (acellular) Vaccine (ADAcel) 0.5 milliLiter(s) IntraMuscular once  docusate sodium 100 milliGRAM(s) Oral three times a day  fentaNYL (3 MICROgram(s)/mL) + BUpivacaine 0.01% in 0.9% Sodium Chloride PCEA 250 milliLiter(s) Epidural PCA Continuous  ferrous    sulfate 325 milliGRAM(s) Oral three times a day with meals  heparin  Injectable 5000 Unit(s) SubCutaneous every 12 hours  ibuprofen  Tablet 600 milliGRAM(s) Oral every 6 hours  ketorolac   Injectable 30 milliGRAM(s) IV Push every 6 hours  lactated ringers. 1000 milliLiter(s) (125 mL/Hr) IV Continuous <Continuous>  levothyroxine 50 MICROGram(s) Oral daily  oxyCODONE    IR 5 milliGRAM(s) Oral every 3 hours  oxytocin Infusion 41.667 milliUNIT(s)/Min (125 mL/Hr) IV Continuous <Continuous>  oxytocin Infusion 333.333 milliUNIT(s)/Min (1000 mL/Hr) IV Continuous <Continuous>  oxytocin Infusion 41.667 milliUNIT(s)/Min (125 mL/Hr) IV Continuous <Continuous>  prenatal multivitamin 1 Tablet(s) Oral daily    MEDICATIONS  (PRN):  dexamethasone  Injectable 4 milliGRAM(s) IV Push every 6 hours PRN Nausea, IF ondansetron is ineffective after 30 - 60 minutes  diphenhydrAMINE   Capsule 25 milliGRAM(s) Oral every 6 hours PRN Itching  fentaNYL (3 MICROgram(s)/mL) + BUpivacaine 0.01% in 0.9% Sodium Chloride PCEA Rescue [facility] Bolus 5 milliLiter(s) Epidural every 15 minutes PRN Moderate Pain (4 - 6)  glycerin Suppository - Adult 1 Suppository(s) Rectal at bedtime PRN Constipation  lanolin Ointment 1 Application(s) Topical every 3 hours PRN Sore Nipples  naloxone Injectable 0.1 milliGRAM(s) IV Push every 3 minutes PRN For ANY of the following changes in patient status:  A. RR LESS THAN 10 breaths per minute, B. Oxygen saturation LESS THAN 90%, C. Sedation score of 6  ondansetron Injectable 4 milliGRAM(s) IV Push every 6 hours PRN Nausea  oxyCODONE    IR 5 milliGRAM(s) Oral every 4 hours PRN Severe Pain (7 - 10)  simethicone 80 milliGRAM(s) Chew every 4 hours PRN Gas      OBJECTIVE:    Assessment of Epidural Catheter Site: 	    [X] Dressing intact	[X] Site non-tender	[X] Site without erythema, discharge, edema  [X] Epidural tubing and connection checked	[X] Gross neurological exam within normal limits  [ ] Catheter removed – tip intact		                          9.1    16.1  )-----------( 240      ( 08 Nov 2017 05:35 )             27.8     Vital Signs Last 24 Hrs  T(C): 36.8 (11-08-17 @ 08:46), Max: 36.9 (11-08-17 @ 05:00)  T(F): 98.2 (11-08-17 @ 08:46), Max: 98.5 (11-08-17 @ 05:00)  HR: 92 (11-08-17 @ 08:46) (74 - 102)  BP: 114/74 (11-08-17 @ 08:46) (95/52 - 117/71)  BP(mean): 76 (11-07-17 @ 22:30) (61 - 88)  RR: 18 (11-08-17 @ 08:46) (14 - 18)  SpO2: 97% (11-07-17 @ 22:30) (96% - 100%)      Sedation Score:	[X] Alert	[ ] Drowsy	[ ] Arousable  [ ] Asleep  [ ] Unresponsive    Side Effects:	[X] None	[ ] Nausea	[ ] Vomiting  [ ] Pruritus  		[ ] Weakness  [ ] Numbness  [ ] Other:    ASSESSMENT/ PLAN:    Therapy:                         [X] Continue   [ ] Discontinue   [ ] Change to PRN Analgesics    Documentation and Verification of current medications:  [X] Done	[ ] Not done, not eligible, reason:    Comments:
PA POD # 2 C/S Note    Blood Type:  O Positive        Rubella:  Immune              RPR: Negative    Pain:  Controlled with Pain Protocol  Complaints:  None  Pt. is ambulating, DTV, passing flatus, & tolerating regular diet.  Lochia:  WNL  Breast/Formula Feeding-Infant in NICU due to "fluid in lung"    VS:  T(C): 36.8  HR: 71  BP: 104/71  RR: 16                        9.1    16.1  )-----------( 240      ( 08 Nov 2017 05:35 )             27.8     Complete Blood Count (11.06.17 @ 17:06)    WBC Count: 12.17 K/uL    RBC Count: 3.81 M/uL    Hemoglobin: 10.6 g/dL    Hematocrit: 32.9 %    Platelet Count - Automated: 279 K/uL      Abd:  Soft, non-distended, non-tender            Fundus-firm            Incision- C/D/I-SQ  Extremities:  Edema - none             Calf Tenderness - none    Assessment:    37y y.o. G 2 P 2002      POD # 2 S/P Primary C/S  for Placenta Previa with Breech Presentation with Anemia (VSS, asx) in stable condition.    PMHx significant for:  Fatty Liver, Hypothyroidism  Current Issues:  Anemia  Plan:  Increase OOB             Cont. pain protocol             CBC as above             Cont. Reg. Diet             Cont. PO Care.            Cont. DVT PPx             Cont. Synthroid              Iron Supplementation    GENIA Roberts
Pain Management Attending Addendum    SUBJECTIVE: Patient comfortable    Therapy:	  [ ] IV PCA	   [ ] Epidural           [ ] s/p Spinal Opoid              [x ] Postpartum infusion	  [ ] Patient controlled regional anesthesia (PCRA)    [ ] prn Analgesics    OBJECTIVE:   [x ] No new signs     [ ] Other:    Side Effects:  [x ] None			[ ] Other:    Assessment of Catheter Site:		[ ] Intact		[ ] Other:    ASSESSMENT/PLAN  [ ] Continue current therapy    [ ] Therapy changed to:    [ ] IV PCA       [ ] Epidural     [x ] prn Analgesics     [ ] post partum infusion    Comments:
Pain Management Attending Addendum    SUBJECTIVE: Patient doing well with PCEA    Therapy:    [X] PCEA    OBJECTIVE:   [X] Pain appropriately controlled    [ ] Other:    Side Effects:  [X] None	             [ ] Nausea              [ ] Pruritis        [ ] Weakness          [ ] Numbness        	[ ] Other:    ASSESSMENT/PLAN:    [X] Continue current therapy    [ ] Therapy changed to:    [ ] PRN Analgesics   [ ] IV PCA    Comments:
Postpartum Note-  Section POD#3    Allergies    No Known Allergies    Intolerances    Prenatal labs:  Rubella:   Immune        RPR:  Negative         Blood Type:  0+    S:Patient is a  37y G2    P 2   POD#3 S/P C/Sec  Subjective: Patient w/o complaints, pain is controlled.  Pt is OOB, tolerating PO, passing flatus. Lochia WNL.   Feeding: Pumping secondary to the baby in the NICU    O:  Vital Signs Last 24 Hrs  T(C): 36.8 (10 Nov 2017 05:29), Max: 36.9 (2017 10:36)  T(F): 98.2 (10 Nov 2017 05:29), Max: 98.4 (2017 10:36)  HR: 72 (10 Nov 2017 05:29) (72 - 84)  BP: 110/74 (10 Nov 2017 05:29) (105/76 - 113/73)  BP(mean): --  RR: 18 (10 Nov 2017 05:29) (18 - 18)  SpO2: 98% (2017 10:36) (98% - 98%)     Gen: NAD  CV: rrr s1s2, CTABL  Abdomen: Soft, nontender, non-distended, fundus firm.  Bowel Sounds x 4 quadrants  Incision: Clean, dry, and intact.  Negative erythema/edema/ecchymosis   Sub Q  Lochia WNL  Ext:  Neg Edema, Neg Calf tenderness. Pedal pulses palpated B/L      A/P:  37y  POD # 3 S/P  primary  section for breech presentation and placenta previa, doing well    PMHx: anemia, Fatty liver  Current Issues: hypothyroid-synthroid 50mcg, baby in the NICU for possible TTE    Increase OOB  Regular diet  AM CBC  PO Pain Protocol  Routine Postop/Postpartum care  Discharge Planning
Postpartum Note-  Section POD#4    Allergies    No Known Allergies    Intolerances    Prenatal labs:  Rubella:   Immune        RPR:  Negative         Blood Type:  0+    S:Patient is a  37y G2    P 2   POD#4 S/P C/Sec  Subjective: Patient w/o complaints, pain is controlled.  Pt is OOB, tolerating PO, passing flatus. Lochia WNL.   Feeding: Pumping secondary to the baby in the NICU    O:  Vital Signs stable afebrile     Gen: NAD  Abdomen: Soft, nontender, non-distended, fundus firm.  Bowel Sounds x 4 quadrants  Incision: Clean, dry, and intact.  Negative erythema/edema/ecchymosis   Sub Q  Lochia WNL  Ext:  Neg Edema, Neg Calf tenderness. Pedal pulses palpated B/L      A/P:  37y  POD # 4 S/P  primary  section for breech presentation and placenta previa, doing well    PMHx: anemia, Fatty liver  Current Issues: hypothyroid-synthroid 50mcg, baby in the NICU for possible TTE    Increase OOB  Regular diet  AM CBC  PO Pain Protocol  Routine Postop/Postpartum care  Discharge Planning
PA POD # 1 C/S Note    Blood Type:  O Positive        Rubella:  Immune              RPR: Negative    Pain:  Controlled with PCEA/Pain Protocol  Complaints:  None  Pt. is ambulating, DTV, passing flatus, & tolerating regular diet.  Lochia:  WNL  Breast/Formula Feeding-Infant in NICU due to "fluid in lung"    VS:  T(C): 36.9 (11-08-17 @ 05:00), Max: 36.9 (11-08-17 @ 05:00)  HR: 84 (11-08-17 @ 05:00) (74 - 102)  BP: 101/67 (11-08-17 @ 05:00) (95/52 - 117/71)  RR: 18 (11-08-17 @ 05:00) (14 - 18)  SpO2: 97% (11-07-17 @ 22:30) (96% - 100%)                          9.1    16.1  )-----------( 240      ( 08 Nov 2017 05:35 )             27.8     Complete Blood Count (11.06.17 @ 17:06)    WBC Count: 12.17 K/uL    RBC Count: 3.81 M/uL    Hemoglobin: 10.6 g/dL    Hematocrit: 32.9 %    Platelet Count - Automated: 279 K/uL      Abd:  Soft, non-distended, non-tender            Fundus-firm            Incision- C/D/I-SQ; Dressing removed  Extremities:  Edema - none             Calf Tenderness - none    Assessment:    37y y.o. G 2 P 2002      POD # 1 S/P Primary C/S  for Placenta Previa with Breech Presentation with Anemia (VSS, asx) in stable condition.    PMHx significant for:  Fatty Liver, Hypothyroidism  Current Issues:  Anemia  Plan:  Increase OOB             Cont. Tylenol, Toradol, PCEA, IVF             CBC as above             Cont. Reg. Diet             Cont. PO Care.            Cont. DVT PPx             Cont. Synthroid              Iron Supplementation    GENIA Roberts

## 2017-11-11 NOTE — PROGRESS NOTE ADULT - PROBLEM SELECTOR PLAN 1
Return in about 2 weeks (around 6/8/2017).    During the hours of 8:00 am to 5:00 pm Monday through Friday, please contact us at one of the following offices: The  [facility] access hospital [phone]. You can also contact us anytime thru [facility] to make an appointment, questions for your provider and medication refills.    If it is urgent that you speak with someone outside of these hours, our [facility] Call Center will be able to assist you at [phone].    Thank you for choosing [facility] for your Dermatology care.    You are scheduled for June 7 th at 10 am for follow up .    If your prescription(s) are not covered or too expensive please do not pick them up - call our office to discuss other options. Thank-you!    If any of your areas becomes bigger, bleed easily or for no reason, change color or are painful call for sooner appointment.     Triamcinolone 0.1% ointment: Apply to affected areas to the body 3 times a day. Until skin is smooth. In between moisturize skin with a cream or ointment. Ok to apply medication to the legs twice a day and wrap legs after medication is applied.     Ok to continue Prednisone 20 mg (taper) as prescribed.  
Cont. PP/PO Care
Increase OOB  Regular diet  AM CBC  PO Pain Protocol  Routine Postop/Postpartum care  Discharge Planning
Increase OOB  Regular diet  PO Pain Protocol  Routine Postop/Postpartum care  Discharge Planning
Cont. PP/PO Care

## 2017-11-11 NOTE — PROGRESS NOTE ADULT - ATTENDING COMMENTS
Pt seen and examined. Agree with above assessment and plan.
Pt seen and examined. Agree with above assessment and plan.
I have seen the patient and agree with above assessment and plan.
Patient seen and examined. Agree with above.

## 2017-11-11 NOTE — PROGRESS NOTE ADULT - ASSESSMENT
37y  POD # 3 S/P  primary  section for breech presentation and placenta previa, doing well
37y  POD # 4 S/P  primary  section for breech presentation and placenta previa, doing well
37y y.o. G 2 P 2002      POD # 2 S/P Primary C/S  for Placenta Previa with Breech Presentation with Anemia (VSS, asx) in stable condition.
37y y.o. G 2 P 2002      POD # 1 S/P Primary C/S  for Placenta Previa with Breech Presentation with Anemia (VSS, asx) in stable condition.

## 2017-11-22 ENCOUNTER — APPOINTMENT (OUTPATIENT)
Dept: OBGYN | Facility: CLINIC | Age: 37
End: 2017-11-22
Payer: COMMERCIAL

## 2017-11-22 PROCEDURE — 0503F POSTPARTUM CARE VISIT: CPT

## 2017-12-20 ENCOUNTER — APPOINTMENT (OUTPATIENT)
Dept: OBGYN | Facility: CLINIC | Age: 37
End: 2017-12-20
Payer: COMMERCIAL

## 2017-12-20 PROCEDURE — 0503F POSTPARTUM CARE VISIT: CPT

## 2018-04-15 ENCOUNTER — RESULT REVIEW (OUTPATIENT)
Age: 38
End: 2018-04-15

## 2018-04-16 ENCOUNTER — APPOINTMENT (OUTPATIENT)
Dept: OBGYN | Facility: CLINIC | Age: 38
End: 2018-04-16
Payer: COMMERCIAL

## 2018-04-16 PROCEDURE — 99395 PREV VISIT EST AGE 18-39: CPT

## 2018-04-27 NOTE — PROGRESS NOTE ADULT - MINUTES
Pt is a 71 yo F w/ PMHx of DM Type II and HTN p/w left sided weakness and numbness x 2 days.  HPI was obtained through interpretation by daughter.  Pt states that left sided weakness and numbness started Wednesday night. Pt states that she had associated drooling from the left side of her mouth and heavy tongue.  Pt states that her "left leg wouldn't respond" and her left arm felt weak, so she was not able to walk.  She states that she believed it was due to her diabetes and went to sleep. The next morning her daughter called her and she states that the pt's speech was "incomprehensible," so the daughter called an ambulance.  Pt states that the last time she felt normal was Wednesday morning and that she has never had a stroke or these symptoms before. Pt states that left sided weakness was better on Thursday than on Wednesday.  Pt endorses intermittent lightheadedness and blurry vision since wednesday.  Pt denies CP, SOB, N/V/D, fever, chills, diaphoresis, tinnitus, edema, vertigo.     Of note: Pt is prescribed donepezil and sertraline, but ran out and has not refilled her prescription/has not been taking these medications. [age] y/o Bengali speaking Female, with a PmHx of DM Type II, HTN, mild Dementia, Depression, p/w left sided weakness and numbness x 2 days.  HPI was obtained through interpretation by daughter.  Pt states that left sided weakness and numbness started Wednesday night. Pt states that she had associated drooling from the left side of her mouth and heavy tongue.  Pt states that her "left leg wouldn't respond" and her left arm felt weak, so she was not able to walk.  She states that she believed it was due to her diabetes and went to sleep. The next morning her daughter called her and she states that the pt's speech was "incomprehensible," so the daughter called an ambulance.  Pt states that the last time she felt normal was Wednesday morning and that she has never had a stroke or these symptoms before. Pt states that left sided weakness was better on Thursday than on Wednesday.  Pt endorses intermittent lightheadedness and blurry vision since Wednesday.  Pt denies CP, SOB, N/V/D, fever, chills, diaphoresis, tinnitus, edema, vertigo. She appears comfortable at this time. She is being admitted to telemetry for r/o cva.    => Of note: Pt was prescribed donepezil and sertraline, but ran out and has not refilled her prescription/has not been taking these medications for the past 2-3 months. 69 yo Turkish speaking woman PMH of insulin dependent DM Type II, HTN, mild Dementia, Depression, p/w slurred speechm left sided weakness and numbness x 2 days.  HPI was obtained through interpretation by daughter.  Pt states that left sided weakness and numbness started 2 days ago (Wednesday night_. Pt states that she had associated drooling from the left side of her mouth and her tongue felt heavy.  Pt states that her "left leg wouldn't respond" and her left arm felt weak, so she was not able to walk.  She states that she believed it was due to her diabetes and went to sleep. The next morning her daughter called her and she states that the pt's speech was "incomprehensible," so the daughter called an ambulance.  Pt states that the last time she felt normal was Wednesday morning and that she has never had a stroke or these symptoms before. Pt states that left sided weakness has improved and was better yesterday than initial day.  Pt endorses intermittent lightheadedness when ambulating and blurry vision since Wednesday.  Pt denies CP, SOB, N/V/D, fever, chills, diaphoresis, tinnitus, edema, vertigo. She appears comfortable at this time. She is being admitted to telemetry to r/o cva.    => Of note: Pt was prescribed donepezil and sertraline, but ran out and has not refilled her prescription/has not been taking these medications for the past 2-3 months. 71 yo Norwegian speaking woman PMH of insulin dependent DM Type II, HTN, mild Dementia, Depression, p/w slurred speechm left sided weakness and numbness x 2 days.  HPI was obtained through interpretation by daughter.  Pt states that left sided weakness and numbness started 2 days ago (Wednesday night). Pt states that she had associated drooling from the left side of her mouth and her tongue felt heavy.  Pt states that her "left leg wouldn't respond" and her left arm felt weak, so she was not able to walk.  She states that she believed it was due to her diabetes and went to sleep. The next morning her daughter called her and she states that the pt's speech was "incomprehensible," so the daughter called an ambulance.  Pt states that the last time she felt normal was Wednesday morning and that she has never had a stroke or these symptoms before. Pt states that left sided weakness has improved and was better yesterday than initial day.  Pt endorses intermittent lightheadedness when ambulating and blurry vision since Wednesday.  Pt denies CP, SOB, N/V/D, fever, chills, diaphoresis, tinnitus, edema, vertigo. She appears comfortable at this time. She is being admitted to telemetry to r/o cva.    => Of note: Pt was prescribed donepezil and sertraline, but ran out and has not refilled her prescription/has not been taking these medications for the past 2-3 months.

## 2019-03-26 NOTE — PRE-OP CHECKLIST - HEART RATE (BEATS/MIN)
SUBJECTIVE:  Here for a spot of concern    Site:  nose  Duration:  Couple months  Treatment:  None  Itching:  None  Bleeding:  Yes after picking  Change in size:  Smaller    Site:  Left temple  Duration:  Couple years  Treatment:  None  Itching:  None  Bleeding:  None  Change in size:  No      Patient denies any other skin problems.  Denies easy bruising or bleeding.    FHX of rosacea--negative  Sunscreens--negative  Occupation--retired  History of skin cancer--Actinic keratosis   Family history of skin cancer--type unknown-- aunt  Lives with--, [place]    OBJECTIVE:   (well developed/overweight) in NAD (no acute distress).  Patient has a pleasant affect.   Patient is well groomed.  Above the waist exam:  head (including face and scalp), neck, upper extremities,  chest, back, abdomen -- except breasts.    ASSESSMENT AND PLAN:    Seborrheic Keratosis  Hyperkeratotic, well-defined pink/brown papule on the left temple.  Follow  Call if change       Actinic Keratosis  Gritty, pink papule(s) on the mid nose, left ala, right mid nose, left lateral eyebrow   Total lesions treated: 5   Cryotherapy done with information given.  Patient opts to have procedure today. Understands risk of permanent pigment change, persistence, blister, crusting, inflammation.  Call if persists, recurs.    Suggest sunscreens, monthly self-examinations, and yearly total skin exam: declines  August--above the waist only.  Patient to watch for the ABCDE's of pigmented lesions or persistent crusts, erosions, irritated areas.  Technique of skin self-exam discussed with patient and given the AAD (American Academy of Dermatology) information on this.   Observe closely for skin damage/changes, and call if such occurs.   fhx skin ca    Call sooner if any problems or concerns.      On 3/26/2019, Balbina GARCIA CMA scribed the services personally performed by Antolin Toney MD  I, Dr. Toney, attest that I saw and examined the patient and agree with  the above documentation as scribed. The documentation recorded by the scribe accurately and completely reflects the service(s) I personally performed and the decisions made by me.           102

## 2019-04-08 ENCOUNTER — TRANSCRIPTION ENCOUNTER (OUTPATIENT)
Age: 39
End: 2019-04-08

## 2019-04-17 ENCOUNTER — TRANSCRIPTION ENCOUNTER (OUTPATIENT)
Age: 39
End: 2019-04-17

## 2021-04-09 ENCOUNTER — APPOINTMENT (OUTPATIENT)
Dept: OBGYN | Facility: CLINIC | Age: 41
End: 2021-04-09
Payer: COMMERCIAL

## 2021-04-09 VITALS
DIASTOLIC BLOOD PRESSURE: 80 MMHG | HEIGHT: 62 IN | BODY MASS INDEX: 26.73 KG/M2 | SYSTOLIC BLOOD PRESSURE: 116 MMHG | WEIGHT: 145.25 LBS | TEMPERATURE: 97.4 F

## 2021-04-09 DIAGNOSIS — Z78.9 OTHER SPECIFIED HEALTH STATUS: ICD-10-CM

## 2021-04-09 DIAGNOSIS — R06.00 DYSPNEA, UNSPECIFIED: ICD-10-CM

## 2021-04-09 PROCEDURE — 99395 PREV VISIT EST AGE 18-39: CPT

## 2021-04-09 PROCEDURE — 99072 ADDL SUPL MATRL&STAF TM PHE: CPT

## 2021-04-09 PROCEDURE — 36415 COLL VENOUS BLD VENIPUNCTURE: CPT

## 2021-04-09 RX ORDER — LEVOTHYROXINE SODIUM 0.05 MG/1
50 TABLET ORAL
Refills: 0 | Status: ACTIVE | COMMUNITY

## 2021-04-09 NOTE — PHYSICAL EXAM
[Appropriately responsive] : appropriately responsive [Alert] : alert [No Acute Distress] : no acute distress [Soft] : soft [Non-tender] : non-tender [Non-distended] : non-distended [Oriented x3] : oriented x3 [Examination Of The Breasts] : a normal appearance [No Discharge] : no discharge [No Masses] : no breast masses were palpable [Labia Majora] : normal [Labia Minora] : normal [No Bleeding] : There was no active vaginal bleeding [Normal] : normal [Uterine Adnexae] : normal

## 2021-04-10 ENCOUNTER — TRANSCRIPTION ENCOUNTER (OUTPATIENT)
Age: 41
End: 2021-04-10

## 2021-04-10 LAB
BASOPHILS # BLD AUTO: 0.04 K/UL
BASOPHILS NFR BLD AUTO: 0.4 %
EOSINOPHIL # BLD AUTO: 0.19 K/UL
EOSINOPHIL NFR BLD AUTO: 2 %
FSH SERPL-MCNC: 9.4 IU/L
HCT VFR BLD CALC: 42.4 %
HGB BLD-MCNC: 13.4 G/DL
IMM GRANULOCYTES NFR BLD AUTO: 0.2 %
LH SERPL-ACNC: 5.8 IU/L
LYMPHOCYTES # BLD AUTO: 2.3 K/UL
LYMPHOCYTES NFR BLD AUTO: 24.7 %
MAN DIFF?: NORMAL
MCHC RBC-ENTMCNC: 30.9 PG
MCHC RBC-ENTMCNC: 31.6 GM/DL
MCV RBC AUTO: 97.9 FL
MONOCYTES # BLD AUTO: 0.76 K/UL
MONOCYTES NFR BLD AUTO: 8.2 %
NEUTROPHILS # BLD AUTO: 6.01 K/UL
NEUTROPHILS NFR BLD AUTO: 64.5 %
PLATELET # BLD AUTO: 338 K/UL
PROLACTIN SERPL-MCNC: 17.7 NG/ML
RBC # BLD: 4.33 M/UL
RBC # FLD: 12 %
T3FREE SERPL-MCNC: 2.92 PG/ML
T4 FREE SERPL-MCNC: 1.2 NG/DL
TSH SERPL-ACNC: 1.33 UIU/ML
WBC # FLD AUTO: 9.32 K/UL

## 2021-04-11 LAB — HPV HIGH+LOW RISK DNA PNL CVX: NOT DETECTED

## 2021-04-11 NOTE — DISCUSSION/SUMMARY
[FreeTextEntry1] : Pap done today. Blood ordered to check hormone levels. \par \par Prescription for mammogram screening and breast US given. \par \par Self-breast exam reviewed. \par \par She will follow up annually and as needed.

## 2021-04-11 NOTE — HISTORY OF PRESENT ILLNESS
[Excessive Bleeding] : bleeding has been excessive [Using ___ Tampons Per 24 Hr] : she has been using [unfilled] tampons per 24 hours [Tubal Occlusion] : has had a tubal occlusion [Monogamous (Male Partner)] : is monogamous with a male partner [Y] : Positive pregnancy history [Menarche Age: ____] : age at menarche was [unfilled] [Currently Active] : currently active [Men] : men [Vaginal] : vaginal [No] : No [Patient refuses STI testing] : Patient refuses STI testing [LMPDate] : 4/8/2021 [PGHxTotal] : 2 [[facility] : [facility] [PGxPremature] : 1 [[facility]] : 1 [FreeTextEntry1] : 4/8/2021

## 2021-04-11 NOTE — REVIEW OF SYSTEMS
[Patient Intake Form Reviewed] : Patient intake form was reviewed [Negative] : Heme/Lymph [Recent Weight Gain (___ Lbs)] : recent [unfilled] ~Ulb weight gain

## 2021-04-11 NOTE — END OF VISIT
[FreeTextEntry3] : I, Kirstie Ryan solely acted as scribe for Dr. Karishma Chacon on 04/09/2021. All medical entries made by the Scribe were at my, Dr. Chacon's, direction and personally dictated by me on 04/09/2021. I have reviewed the chart and agree that the record accurately reflects my personal performance of the history, physical exam, assessment, and plan. I have also personally directed, reviewed, and agreed with the chart.

## 2021-06-14 LAB — CYTOLOGY CVX/VAG DOC THIN PREP: NORMAL

## 2021-08-11 NOTE — DISCHARGE NOTE OB - AFTER URINATION AND/OR BOWEL MOVEMENT, CLEAN WITH WARM WATER USING A PERI- BOTTLE, THEN PAT DRY WITH TOILET TISSUE
Discussed throat related symptoms and history of reflux  Reviewed LPR causes, symptoms, and treatment options  Informed pt about inflammation caused by acid reflux impacting the vocal cords  Offered laryngoscope in office today to rule out concerns causing the symptoms  Additional options include PPI, H 2 blockers, or GI consultation      Agreed to resume reflux medications  Follow up for laryngoscopy if needed Statement Selected

## 2022-04-05 ENCOUNTER — APPOINTMENT (OUTPATIENT)
Dept: OBGYN | Facility: CLINIC | Age: 42
End: 2022-04-05
Payer: COMMERCIAL

## 2022-04-05 VITALS
DIASTOLIC BLOOD PRESSURE: 68 MMHG | BODY MASS INDEX: 27.23 KG/M2 | HEIGHT: 62 IN | SYSTOLIC BLOOD PRESSURE: 112 MMHG | WEIGHT: 148 LBS

## 2022-04-05 PROCEDURE — 82270 OCCULT BLOOD FECES: CPT

## 2022-04-05 PROCEDURE — 99214 OFFICE O/P EST MOD 30 MIN: CPT | Mod: 25

## 2022-04-05 PROCEDURE — 36415 COLL VENOUS BLD VENIPUNCTURE: CPT

## 2022-04-05 PROCEDURE — 99396 PREV VISIT EST AGE 40-64: CPT

## 2022-04-07 LAB
BASOPHILS # BLD AUTO: 0.05 K/UL
BASOPHILS NFR BLD AUTO: 0.6 %
DATE COLLECTED: NORMAL
EOSINOPHIL # BLD AUTO: 0.09 K/UL
EOSINOPHIL NFR BLD AUTO: 1.2 %
FSH SERPL-MCNC: 6.5 IU/L
HCT VFR BLD CALC: 40.6 %
HEMOCCULT SP1 STL QL: NEGATIVE
HGB BLD-MCNC: 13 G/DL
HPV HIGH+LOW RISK DNA PNL CVX: NOT DETECTED
IMM GRANULOCYTES NFR BLD AUTO: 0.3 %
LH SERPL-ACNC: 5 IU/L
LYMPHOCYTES # BLD AUTO: 1.98 K/UL
LYMPHOCYTES NFR BLD AUTO: 25.4 %
MAN DIFF?: NORMAL
MCHC RBC-ENTMCNC: 31.3 PG
MCHC RBC-ENTMCNC: 32 GM/DL
MCV RBC AUTO: 97.6 FL
MONOCYTES # BLD AUTO: 0.64 K/UL
MONOCYTES NFR BLD AUTO: 8.2 %
NEUTROPHILS # BLD AUTO: 5 K/UL
NEUTROPHILS NFR BLD AUTO: 64.3 %
PLATELET # BLD AUTO: 339 K/UL
QUALITY CONTROL: YES
RBC # BLD: 4.16 M/UL
RBC # FLD: 12.5 %
TSH SERPL-ACNC: 1.01 UIU/ML
WBC # FLD AUTO: 7.78 K/UL

## 2022-04-09 NOTE — END OF VISIT
[FreeTextEntry3] : I, Deloris Jones solely acted as a scribe on behalf of  on 04/05/2022. All medical entries made by the scribe were at my, Dr. Chacon, direction and personally dictated by me on 04/05/2022 . I have reviewed the chart and agree that the record accurately reflects my personal performance of the history, physical exam, assessment and plan. I have also personally directed, reviewed and agreed with the chart.\par

## 2022-04-09 NOTE — REVIEW OF SYSTEMS
[Sight Problems] : sight problems [Sleep Disturbances] : sleep disturbances [Negative] : Heme/Lymph [Patient Intake Form Reviewed] : Patient intake form was reviewed

## 2022-04-09 NOTE — DISCUSSION/SUMMARY
[FreeTextEntry1] : Pap done today\par Informed patient Mirena IUD would be a good fit for her situation.\par Prescription for mammogram screening and breast US given.\par Self-breast exam reviewed. \par Recommended to receive a Hysteroscopy to evaluate the heavy periods. \par Hormonal blood panel collected today. \par She will follow up annually or as needed.\par \par All questions and concerns were discussed.

## 2022-04-09 NOTE — PHYSICAL EXAM
[Chaperone Present] : A chaperone was present in the examining room during all aspects of the physical examination [Appropriately responsive] : appropriately responsive [Alert] : alert [No Acute Distress] : no acute distress [Soft] : soft [Non-tender] : non-tender [Non-distended] : non-distended [Oriented x3] : oriented x3 [Examination Of The Breasts] : a normal appearance [No Discharge] : no discharge [No Masses] : no breast masses were palpable [Labia Majora] : normal [Labia Minora] : normal [Normal] : normal [Uterine Adnexae] : normal [No Tenderness] : no tenderness [Nl Sphincter Tone] : normal sphincter tone [FreeTextEntry1] : ANGEL Winn [FreeTextEntry9] : Guaiac negative

## 2022-04-09 NOTE — HISTORY OF PRESENT ILLNESS
[N] : Patient does not use contraception [Y] : Positive pregnancy history [Menarche Age: ____] : age at menarche was [unfilled] [Currently Active] : currently active [Men] : men [Vaginal] : vaginal [No] : No [TextBox_4] : Annual [Mammogramdate] : 2016 [PapSmeardate] : 4/9/21 [TextBox_31] : negative [ColonoscopyDate] : never [HPVDate] : 4/9/21 [TextBox_78] : negative [LMPDate] : 3/30/22 [PGHxTotal] : 2 [[facility]] : 1 [PGxPremature] : 1 [[facility]xLiving] : 2 [TextBox_29] : n/a [TextBox_36] : n/a [TextBox_6] : 3/30/22 [TextBox_9] : 13 [TextBox_28] : Very heavy periods , first 2 days of menses leaks through cloths  [TextBox_34] : n/a [TextBox_18] : n/a [FreeTextEntry1] : 3/30/22

## 2022-04-10 LAB
CYTOLOGY CVX/VAG DOC THIN PREP: NORMAL
PROLACTIN SERPL-MCNC: 26.1 NG/ML

## 2022-04-11 ENCOUNTER — RESULT REVIEW (OUTPATIENT)
Age: 42
End: 2022-04-11

## 2022-04-11 ENCOUNTER — APPOINTMENT (OUTPATIENT)
Dept: ULTRASOUND IMAGING | Facility: CLINIC | Age: 42
End: 2022-04-11
Payer: COMMERCIAL

## 2022-04-11 ENCOUNTER — APPOINTMENT (OUTPATIENT)
Dept: MAMMOGRAPHY | Facility: CLINIC | Age: 42
End: 2022-04-11
Payer: COMMERCIAL

## 2022-04-11 PROCEDURE — 77067 SCR MAMMO BI INCL CAD: CPT

## 2022-04-11 PROCEDURE — 76641 ULTRASOUND BREAST COMPLETE: CPT | Mod: 50

## 2022-04-11 PROCEDURE — 77063 BREAST TOMOSYNTHESIS BI: CPT

## 2022-04-18 ENCOUNTER — RESULT REVIEW (OUTPATIENT)
Age: 42
End: 2022-04-18

## 2022-04-18 ENCOUNTER — APPOINTMENT (OUTPATIENT)
Dept: ULTRASOUND IMAGING | Facility: CLINIC | Age: 42
End: 2022-04-18
Payer: COMMERCIAL

## 2022-04-18 ENCOUNTER — APPOINTMENT (OUTPATIENT)
Dept: MAMMOGRAPHY | Facility: CLINIC | Age: 42
End: 2022-04-18
Payer: COMMERCIAL

## 2022-04-18 PROCEDURE — G0279: CPT | Mod: RT

## 2022-04-18 PROCEDURE — 76641 ULTRASOUND BREAST COMPLETE: CPT | Mod: 50

## 2022-04-18 PROCEDURE — 77065 DX MAMMO INCL CAD UNI: CPT | Mod: RT

## 2022-04-29 ENCOUNTER — NON-APPOINTMENT (OUTPATIENT)
Age: 42
End: 2022-04-29

## 2022-05-10 ENCOUNTER — APPOINTMENT (OUTPATIENT)
Dept: ANTEPARTUM | Facility: CLINIC | Age: 42
End: 2022-05-10
Payer: COMMERCIAL

## 2022-05-10 ENCOUNTER — APPOINTMENT (OUTPATIENT)
Dept: OBGYN | Facility: CLINIC | Age: 42
End: 2022-05-10
Payer: COMMERCIAL

## 2022-05-10 ENCOUNTER — ASOB RESULT (OUTPATIENT)
Age: 42
End: 2022-05-10

## 2022-05-10 VITALS
DIASTOLIC BLOOD PRESSURE: 60 MMHG | BODY MASS INDEX: 27.23 KG/M2 | HEIGHT: 62 IN | SYSTOLIC BLOOD PRESSURE: 102 MMHG | WEIGHT: 148 LBS

## 2022-05-10 DIAGNOSIS — Z01.419 ENCOUNTER FOR GYNECOLOGICAL EXAMINATION (GENERAL) (ROUTINE) W/OUT ABNORMAL FINDINGS: ICD-10-CM

## 2022-05-10 DIAGNOSIS — Z12.11 ENCOUNTER FOR SCREENING FOR MALIGNANT NEOPLASM OF COLON: ICD-10-CM

## 2022-05-10 DIAGNOSIS — Z92.89 PERSONAL HISTORY OF OTHER MEDICAL TREATMENT: ICD-10-CM

## 2022-05-10 LAB
HCG UR QL: NEGATIVE
QUALITY CONTROL: YES

## 2022-05-10 PROCEDURE — 57500 BIOPSY OF CERVIX: CPT | Mod: 59

## 2022-05-10 PROCEDURE — 58558Z: CUSTOM

## 2022-05-10 PROCEDURE — 76830 TRANSVAGINAL US NON-OB: CPT

## 2022-05-10 PROCEDURE — 81025 URINE PREGNANCY TEST: CPT

## 2022-05-10 NOTE — PROCEDURE
[Hysteroscopy] : Hysteroscopy [Time out performed] : Pre-procedure time out performed.  Patient's name, date of birth and procedure confirmed. [Consent Obtained] : Consent obtained [Risks] : risks [Benefits] : benefits [Patient] : patient [Infection] : infection [Bleeding] : bleeding [Allergic Reaction] : allergic reaction [Tolerated Well] : Patient tolerated the procedure well

## 2022-05-15 PROBLEM — Z12.11 COLON CANCER SCREENING: Status: RESOLVED | Noted: 2021-04-09 | Resolved: 2022-05-15

## 2022-05-15 PROBLEM — Z92.89 HISTORY OF SCREENING MAMMOGRAPHY: Status: RESOLVED | Noted: 2021-04-09 | Resolved: 2022-05-15

## 2022-05-15 PROBLEM — Z01.419 ENCOUNTER FOR WELL WOMAN EXAM WITH ROUTINE GYNECOLOGICAL EXAM: Status: RESOLVED | Noted: 2021-04-09 | Resolved: 2022-05-15

## 2022-05-15 NOTE — DISCUSSION/SUMMARY
[FreeTextEntry1] : Cervical tissue biopsy was taken at 10 o'clock. \par IUD option was discussed with patient. She does not desire to have ab IUD insertion at this time. \par She will follow up in 2 weeks for results. . \par \par All questions and concerns were discussed.

## 2022-05-15 NOTE — HISTORY OF PRESENT ILLNESS
[N] : Patient does not use contraception [Y] : Positive pregnancy history [Menarche Age: ____] : age at menarche was [unfilled] [No] : Patient does not have concerns regarding sex [Currently Active] : currently active [Mammogramdate] : 04/18/22 [TextBox_19] : BR3 [TextBox_25] : BR3 [BreastSonogramDate] : 04/18/22 [PapSmeardate] : 04/05/22 [TextBox_31] : NEG [HPVDate] : 04/05/22 [TextBox_78] : NEG [LMPDate] : 04/26/22 [PGHxTotal] : 2 [[facility] HospitalxFul[facility]] : 1 [PGxPremature] : 1 [[facility]] : 2 [FreeTextEntry1] : 04/26/22

## 2022-05-15 NOTE — PHYSICAL EXAM
[Chaperone Present] : A chaperone was present in the examining room during all aspects of the physical examination [Appropriately responsive] : appropriately responsive [Alert] : alert [No Acute Distress] : no acute distress [Oriented x3] : oriented x3 [Labia Majora] : normal [Labia Minora] : normal [Normal] : normal [Uterine Adnexae] : normal [FreeTextEntry1] : ANGEL Prescott

## 2022-05-15 NOTE — END OF VISIT
[FreeTextEntry3] : I, Deloris Jones solely acted as a scribe on behalf of  on 05/10/2022. All medical entries made by the scribe were at my, Dr. Chacon, direction and personally dictated by me on 05/10/2022 . I have reviewed the chart and agree that the record accurately reflects my personal performance of the history, physical exam, assessment and plan. I have also personally directed, reviewed and agreed with the chart.\par \par

## 2022-05-17 ENCOUNTER — APPOINTMENT (OUTPATIENT)
Dept: SURGICAL ONCOLOGY | Facility: CLINIC | Age: 42
End: 2022-05-17
Payer: COMMERCIAL

## 2022-05-17 VITALS
DIASTOLIC BLOOD PRESSURE: 79 MMHG | WEIGHT: 148 LBS | RESPIRATION RATE: 15 BRPM | BODY MASS INDEX: 27.23 KG/M2 | TEMPERATURE: 97.7 F | SYSTOLIC BLOOD PRESSURE: 117 MMHG | HEART RATE: 78 BPM | HEIGHT: 62 IN | OXYGEN SATURATION: 98 %

## 2022-05-17 PROCEDURE — 99204 OFFICE O/P NEW MOD 45 MIN: CPT

## 2022-05-17 PROCEDURE — ZZZZZ: CPT

## 2022-05-17 NOTE — PHYSICAL EXAM
[Normal] : supple, no neck mass and thyroid not enlarged [Normal Supraclavicular Lymph Nodes] : normal supraclavicular lymph nodes [Normal Axillary Lymph Nodes] : normal axillary lymph nodes [Normal] : oriented to person, place and time, with appropriate affect [FreeTextEntry1] : JUAN C  present during exam  [de-identified] : Normal S1, S2.  Regular rate and rhythm. [de-identified] : Complete breast exam performed in supine and upright positions.  No palpable masses, tenderness, nipple discharge, inversion, deviation or enlarged axillary or supraclavicular lymph nodes bilaterally. [de-identified] : Clear breath sounds bilaterally, normal respiratory effort.

## 2022-05-17 NOTE — CONSULT LETTER
[Dear  ___] : Dear  [unfilled], [Consult Letter:] : I had the pleasure of evaluating your patient, [unfilled]. [Consult Closing:] : Thank you very much for allowing me to participate in the care of this patient.  If you have any questions, please do not hesitate to contact me. [Sincerely,] : Sincerely, [FreeTextEntry2] : Karishma Chacon [FreeTextEntry3] : Dr. Anshul Osorio

## 2022-05-17 NOTE — ASSESSMENT
[FreeTextEntry1] : IMP:\par Probably benign mass left breast for which f/u in 6 months advised \par Dense\par No suspicious findings on exam\par  \par \par PLAN:\par Left breast Ultrasound 10/2022 (ordered)\par Pt. will call for results

## 2022-05-17 NOTE — HISTORY OF PRESENT ILLNESS
[de-identified] : Ms. Sol is a 42 y/o female presenting for an initial consultation referred by Karishma Chacon MD. \par \par b/l MMG/US (2022): Indeterminate area of possible architectural distortion in right breast, retroareolar location. Indeterminate cyst versus mass in left breast (2:00). BIRADS-0 \par \par b/l MMG/US (2022): no mammographic evidence of malignancy. Probably benign left breast mass. Targeted left breast ultrasound in 6 months is recommended to demonstrate stability.  BIRADS- 3 \par \par Family history of breast cancer involving her maternal aunt @ age 40 and cousin @ age 40. . \par Past medical history notable for Hypothyroidism, BCC, , arthroscopy of right knee, \par Never a smoker, social alcohol use. \par  (age at first pregnancy - 36 y/o); Menarche age: 15 y/o; LMP: 22\par \par Today she denies palpable breast masses, nipple discharge, skin changes, inversion or breast pain. Denies constitutional symptoms. No health changes.

## 2022-05-20 ENCOUNTER — TRANSCRIPTION ENCOUNTER (OUTPATIENT)
Age: 42
End: 2022-05-20

## 2022-05-20 LAB — CORE LAB BIOPSY: NORMAL

## 2022-09-16 ENCOUNTER — TRANSCRIPTION ENCOUNTER (OUTPATIENT)
Age: 42
End: 2022-09-16

## 2022-10-17 ENCOUNTER — APPOINTMENT (OUTPATIENT)
Dept: ULTRASOUND IMAGING | Facility: CLINIC | Age: 42
End: 2022-10-17

## 2022-10-17 ENCOUNTER — RESULT REVIEW (OUTPATIENT)
Age: 42
End: 2022-10-17

## 2022-10-17 PROCEDURE — 76642 ULTRASOUND BREAST LIMITED: CPT | Mod: LT

## 2023-04-18 ENCOUNTER — APPOINTMENT (OUTPATIENT)
Dept: OBGYN | Facility: CLINIC | Age: 43
End: 2023-04-18
Payer: COMMERCIAL

## 2023-04-18 ENCOUNTER — NON-APPOINTMENT (OUTPATIENT)
Age: 43
End: 2023-04-18

## 2023-04-18 ENCOUNTER — RESULT REVIEW (OUTPATIENT)
Age: 43
End: 2023-04-18

## 2023-04-18 VITALS
SYSTOLIC BLOOD PRESSURE: 126 MMHG | WEIGHT: 144 LBS | BODY MASS INDEX: 26.5 KG/M2 | DIASTOLIC BLOOD PRESSURE: 78 MMHG | HEIGHT: 62 IN

## 2023-04-18 DIAGNOSIS — N88.9 NONINFLAMMATORY DISORDER OF CERVIX UTERI, UNSPECIFIED: ICD-10-CM

## 2023-04-18 DIAGNOSIS — N63.0 UNSPECIFIED LUMP IN UNSPECIFIED BREAST: ICD-10-CM

## 2023-04-18 DIAGNOSIS — G47.00 INSOMNIA, UNSPECIFIED: ICD-10-CM

## 2023-04-18 DIAGNOSIS — E06.3 AUTOIMMUNE THYROIDITIS: ICD-10-CM

## 2023-04-18 DIAGNOSIS — R92.2 INCONCLUSIVE MAMMOGRAM: ICD-10-CM

## 2023-04-18 DIAGNOSIS — Z12.31 ENCOUNTER FOR SCREENING MAMMOGRAM FOR MALIGNANT NEOPLASM OF BREAST: ICD-10-CM

## 2023-04-18 DIAGNOSIS — R45.86 EMOTIONAL LABILITY: ICD-10-CM

## 2023-04-18 PROCEDURE — 99396 PREV VISIT EST AGE 40-64: CPT

## 2023-04-18 PROCEDURE — 99213 OFFICE O/P EST LOW 20 MIN: CPT | Mod: 25

## 2023-04-18 PROCEDURE — 36415 COLL VENOUS BLD VENIPUNCTURE: CPT

## 2023-04-21 ENCOUNTER — TRANSCRIPTION ENCOUNTER (OUTPATIENT)
Age: 43
End: 2023-04-21

## 2023-04-21 LAB
BASOPHILS # BLD AUTO: 0.03 K/UL
BASOPHILS NFR BLD AUTO: 0.3 %
EOSINOPHIL # BLD AUTO: 0.08 K/UL
EOSINOPHIL NFR BLD AUTO: 0.8 %
FSH SERPL-MCNC: 3.8 IU/L
HCT VFR BLD CALC: 42.1 %
HGB BLD-MCNC: 13.9 G/DL
HPV HIGH+LOW RISK DNA PNL CVX: NOT DETECTED
IMM GRANULOCYTES NFR BLD AUTO: 0.3 %
LH SERPL-ACNC: 4 IU/L
LYMPHOCYTES # BLD AUTO: 1.95 K/UL
LYMPHOCYTES NFR BLD AUTO: 20.1 %
MAN DIFF?: NORMAL
MCHC RBC-ENTMCNC: 31.2 PG
MCHC RBC-ENTMCNC: 33 GM/DL
MCV RBC AUTO: 94.6 FL
MONOCYTES # BLD AUTO: 0.95 K/UL
MONOCYTES NFR BLD AUTO: 9.8 %
NEUTROPHILS # BLD AUTO: 6.68 K/UL
NEUTROPHILS NFR BLD AUTO: 68.7 %
PLATELET # BLD AUTO: 339 K/UL
PROLACTIN SERPL-MCNC: 25.5 NG/ML
RBC # BLD: 4.45 M/UL
RBC # FLD: 12.6 %
T3 SERPL-MCNC: 102 NG/DL
T4 SERPL-MCNC: 8.3 UG/DL
TSH SERPL-ACNC: 1.2 UIU/ML
WBC # FLD AUTO: 9.72 K/UL

## 2023-04-23 LAB — CYTOLOGY CVX/VAG DOC THIN PREP: NORMAL

## 2023-04-23 NOTE — END OF VISIT
[FreeTextEntry3] : I, Loulou Yan, solely acted as scribe for Dr. Karishma Chacon on 04/18/23. All medical entries made by the Scribe were at my, Dr. Chacon's, direction and personally dictated by me on 04/18/23. I have reviewed the chart and agree that the record accurately reflects my personal performance of the history, physical exam, assessment and plan. I have also personally directed, reviewed, and agreed with the chart.

## 2023-04-23 NOTE — HISTORY OF PRESENT ILLNESS
[N] : Patient does not use contraception [Y] : Positive pregnancy history [Menarche Age: ____] : age at menarche was [unfilled] [Currently Active] : currently active [Mammogramdate] : 04/18/22 [TextBox_19] : BR3 [TextBox_25] : BR3 [BreastSonogramDate] : 04/18/22 [PapSmeardate] : 04/05/22 [TextBox_31] : NEG [HPVDate] : 04/05/22 [LMPDate] : 03/24/23 [TextBox_78] : NEG [PGHxTotal] : 2 [PGxPremature] : 1 [[facility]xFul[facility]] : 1 [[facility]] : 2 [FreeTextEntry1] : 03/24/23

## 2023-04-23 NOTE — DISCUSSION/SUMMARY
[FreeTextEntry1] : Pap done today.\par \par Hormone and thyroid panel performed today.\par \par Prescription for mammogram screening, breast US, and transvaginal sonogram US given.\par \par Self-breast exam reviewed.\par \par She will follow up annually and as needed.

## 2023-04-25 ENCOUNTER — RESULT REVIEW (OUTPATIENT)
Age: 43
End: 2023-04-25

## 2023-04-25 ENCOUNTER — APPOINTMENT (OUTPATIENT)
Dept: ULTRASOUND IMAGING | Facility: CLINIC | Age: 43
End: 2023-04-25
Payer: COMMERCIAL

## 2023-04-25 ENCOUNTER — APPOINTMENT (OUTPATIENT)
Dept: MAMMOGRAPHY | Facility: CLINIC | Age: 43
End: 2023-04-25
Payer: COMMERCIAL

## 2023-04-25 PROCEDURE — 77066 DX MAMMO INCL CAD BI: CPT

## 2023-04-25 PROCEDURE — 76641 ULTRASOUND BREAST COMPLETE: CPT | Mod: 50

## 2023-04-25 PROCEDURE — 76830 TRANSVAGINAL US NON-OB: CPT

## 2023-04-25 PROCEDURE — G0279: CPT

## 2023-04-27 ENCOUNTER — RESULT REVIEW (OUTPATIENT)
Age: 43
End: 2023-04-27

## 2023-10-02 ENCOUNTER — RESULT REVIEW (OUTPATIENT)
Age: 43
End: 2023-10-02

## 2023-10-02 ENCOUNTER — APPOINTMENT (OUTPATIENT)
Dept: ULTRASOUND IMAGING | Facility: CLINIC | Age: 43
End: 2023-10-02
Payer: COMMERCIAL

## 2023-10-02 PROCEDURE — 76642 ULTRASOUND BREAST LIMITED: CPT | Mod: LT

## 2023-10-03 ENCOUNTER — TRANSCRIPTION ENCOUNTER (OUTPATIENT)
Age: 43
End: 2023-10-03

## 2024-04-15 ENCOUNTER — APPOINTMENT (OUTPATIENT)
Dept: OBGYN | Facility: CLINIC | Age: 44
End: 2024-04-15

## 2024-05-03 ENCOUNTER — APPOINTMENT (OUTPATIENT)
Dept: OBGYN | Facility: CLINIC | Age: 44
End: 2024-05-03
Payer: COMMERCIAL

## 2024-05-03 VITALS
BODY MASS INDEX: 25.4 KG/M2 | HEIGHT: 62 IN | SYSTOLIC BLOOD PRESSURE: 108 MMHG | WEIGHT: 138 LBS | DIASTOLIC BLOOD PRESSURE: 60 MMHG

## 2024-05-03 DIAGNOSIS — N64.59 OTHER SIGNS AND SYMPTOMS IN BREAST: ICD-10-CM

## 2024-05-03 DIAGNOSIS — Z12.31 ENCOUNTER FOR SCREENING MAMMOGRAM FOR MALIGNANT NEOPLASM OF BREAST: ICD-10-CM

## 2024-05-03 DIAGNOSIS — N83.299 OTHER OVARIAN CYST, UNSPECIFIED SIDE: ICD-10-CM

## 2024-05-03 DIAGNOSIS — Z87.42 PERSONAL HISTORY OF OTHER DISEASES OF THE FEMALE GENITAL TRACT: ICD-10-CM

## 2024-05-03 DIAGNOSIS — R93.89 ABNORMAL FINDINGS ON DIAGNOSTIC IMAGING OF OTHER SPECIFIED BODY STRUCTURES: ICD-10-CM

## 2024-05-03 DIAGNOSIS — Z01.419 ENCOUNTER FOR GYNECOLOGICAL EXAMINATION (GENERAL) (ROUTINE) W/OUT ABNORMAL FINDINGS: ICD-10-CM

## 2024-05-03 DIAGNOSIS — R92.30 DENSE BREASTS, UNSPECIFIED: ICD-10-CM

## 2024-05-03 PROCEDURE — 99396 PREV VISIT EST AGE 40-64: CPT

## 2024-05-03 PROCEDURE — 99459 PELVIC EXAMINATION: CPT

## 2024-05-05 PROBLEM — R93.89 THICKENED ENDOMETRIUM: Status: RESOLVED | Noted: 2022-05-10 | Resolved: 2024-05-05

## 2024-05-05 PROBLEM — N64.59 ABNORMAL BREAST FINDING: Status: RESOLVED | Noted: 2022-04-28 | Resolved: 2024-05-05

## 2024-05-05 PROBLEM — N83.299 COMPLEX OVARIAN CYST: Status: RESOLVED | Noted: 2023-04-18 | Resolved: 2024-05-05

## 2024-05-05 PROBLEM — Z87.42 HISTORY OF MENORRHAGIA: Status: RESOLVED | Noted: 2022-04-05 | Resolved: 2024-05-05

## 2024-05-05 NOTE — HISTORY OF PRESENT ILLNESS
[N] : Patient does not use contraception [Y] : Positive pregnancy history [Menarche Age: ____] : age at menarche was [unfilled] [No] : Patient does not have concerns regarding sex [Currently Active] : currently active [Men] : men [Mammogramdate] : 04/25/2023 [TextBox_19] : BR3 [BreastSonogramDate] : 10/02/2023 [TextBox_25] : (L) BR2 [PapSmeardate] : 04/18/2023 [TextBox_31] : NEGATIVE [HPVDate] : 04/18/2023 [TextBox_78] : NEGATIVE [LMPDate] : 04/09/2024 [PGHxTotal] : 2 [[facility]xF[facility]] : 1 [PGxPremature] : 1 [[facility]] : 2 [FreeTextEntry1] : 04/09/2024

## 2024-05-05 NOTE — DISCUSSION/SUMMARY
[FreeTextEntry1] : Pap done today.  Declines BC options.    Prescription for mammogram screening and breast US given every 6 months.    Self-breast exam reviewed.   She will follow up annually and as needed.

## 2024-05-05 NOTE — PHYSICAL EXAM
[Chaperone Present] : A chaperone was present in the examining room during all aspects of the physical examination [[id number]] : A chaperone was present during the pelvic exam. [Appropriately responsive] : appropriately responsive [Alert] : alert [No Acute Distress] : no acute distress [Soft] : soft [Non-tender] : non-tender [Non-distended] : non-distended [Oriented x3] : oriented x3 [Examination Of The Breasts] : a normal appearance [No Discharge] : no discharge [No Masses] : no breast masses were palpable [Labia Majora] : normal [Labia Minora] : normal [No Bleeding] : There was no active vaginal bleeding [Uterine Adnexae] : normal [Normal] : normal

## 2024-05-05 NOTE — END OF VISIT
[FreeTextEntry3] : I, Loulou Yan, solely acted as scribe for Dr. Karishma Chacon on 05/03/2024. All medical entries made by the Scribe were at my, Dr. Chacon's, direction and personally dictated by me on 05/03/2024. I have reviewed the chart and agree that the record accurately reflects my personal performance of the history, physical exam, assessment and plan. I have also personally directed, reviewed, and agreed with the chart.

## 2024-05-07 LAB — HPV HIGH+LOW RISK DNA PNL CVX: NOT DETECTED

## 2024-05-14 LAB — CYTOLOGY CVX/VAG DOC THIN PREP: NORMAL

## 2024-09-09 ENCOUNTER — APPOINTMENT (OUTPATIENT)
Dept: ULTRASOUND IMAGING | Facility: CLINIC | Age: 44
End: 2024-09-09
Payer: COMMERCIAL

## 2024-09-09 ENCOUNTER — RESULT REVIEW (OUTPATIENT)
Age: 44
End: 2024-09-09

## 2024-09-09 ENCOUNTER — APPOINTMENT (OUTPATIENT)
Dept: MAMMOGRAPHY | Facility: CLINIC | Age: 44
End: 2024-09-09
Payer: COMMERCIAL

## 2024-09-09 PROCEDURE — 76641 ULTRASOUND BREAST COMPLETE: CPT | Mod: 50

## 2024-09-09 PROCEDURE — 77063 BREAST TOMOSYNTHESIS BI: CPT

## 2024-09-09 PROCEDURE — 77067 SCR MAMMO BI INCL CAD: CPT

## 2025-04-29 ENCOUNTER — APPOINTMENT (OUTPATIENT)
Dept: ORTHOPEDIC SURGERY | Facility: CLINIC | Age: 45
End: 2025-04-29
Payer: COMMERCIAL

## 2025-04-29 DIAGNOSIS — M25.572 PAIN IN LEFT ANKLE AND JOINTS OF LEFT FOOT: ICD-10-CM

## 2025-04-29 DIAGNOSIS — S96.919A STRAIN OF UNSPECIFIED MUSCLE AND TENDON AT ANKLE AND FOOT LEVEL, UNSPECIFIED FOOT, INITIAL ENCOUNTER: ICD-10-CM

## 2025-04-29 DIAGNOSIS — S83.249A OTHER TEAR OF MEDIAL MENISCUS, CURRENT INJURY, UNSPECIFIED KNEE, INITIAL ENCOUNTER: ICD-10-CM

## 2025-04-29 PROCEDURE — 99204 OFFICE O/P NEW MOD 45 MIN: CPT

## 2025-04-29 PROCEDURE — 73610 X-RAY EXAM OF ANKLE: CPT | Mod: LT

## 2025-04-29 RX ORDER — DICLOFENAC POTASSIUM 50 MG/1
50 TABLET, COATED ORAL 3 TIMES DAILY
Qty: 60 | Refills: 1 | Status: ACTIVE | COMMUNITY
Start: 2025-04-29 | End: 1900-01-01

## 2025-05-27 ENCOUNTER — NON-APPOINTMENT (OUTPATIENT)
Age: 45
End: 2025-05-27

## 2025-05-27 ENCOUNTER — APPOINTMENT (OUTPATIENT)
Dept: OBGYN | Facility: CLINIC | Age: 45
End: 2025-05-27
Payer: COMMERCIAL

## 2025-05-27 VITALS
BODY MASS INDEX: 26.68 KG/M2 | HEIGHT: 62 IN | DIASTOLIC BLOOD PRESSURE: 74 MMHG | WEIGHT: 145 LBS | SYSTOLIC BLOOD PRESSURE: 114 MMHG

## 2025-05-27 DIAGNOSIS — N63.0 UNSPECIFIED LUMP IN UNSPECIFIED BREAST: ICD-10-CM

## 2025-05-27 DIAGNOSIS — R92.30 DENSE BREASTS, UNSPECIFIED: ICD-10-CM

## 2025-05-27 DIAGNOSIS — Z01.419 ENCOUNTER FOR GYNECOLOGICAL EXAMINATION (GENERAL) (ROUTINE) W/OUT ABNORMAL FINDINGS: ICD-10-CM

## 2025-05-27 DIAGNOSIS — R45.86 EMOTIONAL LABILITY: ICD-10-CM

## 2025-05-27 DIAGNOSIS — Z87.898 PERSONAL HISTORY OF OTHER SPECIFIED CONDITIONS: ICD-10-CM

## 2025-05-27 DIAGNOSIS — Z12.31 ENCOUNTER FOR SCREENING MAMMOGRAM FOR MALIGNANT NEOPLASM OF BREAST: ICD-10-CM

## 2025-05-27 PROCEDURE — 99459 PELVIC EXAMINATION: CPT | Mod: NC

## 2025-05-27 PROCEDURE — 99396 PREV VISIT EST AGE 40-64: CPT

## 2025-05-29 PROBLEM — Z87.898 HISTORY OF INSOMNIA: Status: RESOLVED | Noted: 2023-04-18 | Resolved: 2025-05-29

## 2025-05-29 PROBLEM — R45.86 MOOD SWINGS: Status: RESOLVED | Noted: 2023-04-18 | Resolved: 2025-05-29

## 2025-05-29 PROBLEM — N63.0 BREAST NODULE: Status: RESOLVED | Noted: 2023-04-18 | Resolved: 2025-05-29
